# Patient Record
Sex: MALE | Race: BLACK OR AFRICAN AMERICAN | Employment: FULL TIME | URBAN - METROPOLITAN AREA
[De-identification: names, ages, dates, MRNs, and addresses within clinical notes are randomized per-mention and may not be internally consistent; named-entity substitution may affect disease eponyms.]

---

## 2022-04-08 ENCOUNTER — APPOINTMENT (OUTPATIENT)
Dept: NON INVASIVE DIAGNOSTICS | Age: 52
DRG: 639 | End: 2022-04-08
Payer: COMMERCIAL

## 2022-04-08 ENCOUNTER — HOSPITAL ENCOUNTER (INPATIENT)
Age: 52
LOS: 1 days | Discharge: HOME | DRG: 639 | End: 2022-04-09
Attending: STUDENT IN AN ORGANIZED HEALTH CARE EDUCATION/TRAINING PROGRAM | Admitting: INTERNAL MEDICINE
Payer: COMMERCIAL

## 2022-04-08 ENCOUNTER — APPOINTMENT (OUTPATIENT)
Dept: GENERAL RADIOLOGY | Age: 52
DRG: 639 | End: 2022-04-08
Payer: COMMERCIAL

## 2022-04-08 DIAGNOSIS — R55 SYNCOPE AND COLLAPSE: Primary | ICD-10-CM

## 2022-04-08 DIAGNOSIS — R73.9 HYPERGLYCEMIA: ICD-10-CM

## 2022-04-08 DIAGNOSIS — E11.9 TYPE 2 DIABETES MELLITUS WITHOUT COMPLICATION, WITHOUT LONG-TERM CURRENT USE OF INSULIN (HCC): ICD-10-CM

## 2022-04-08 LAB
ABSOLUTE EOS #: 0.1 K/UL (ref 0–0.4)
ABSOLUTE LYMPH #: 1 K/UL (ref 1–4.8)
ABSOLUTE MONO #: 0.4 K/UL (ref 0.1–1.3)
ALBUMIN SERPL-MCNC: 3.7 G/DL (ref 3.5–5.2)
ALP BLD-CCNC: 95 U/L (ref 40–129)
ALT SERPL-CCNC: 23 U/L (ref 5–41)
ANION GAP SERPL CALCULATED.3IONS-SCNC: 9 MMOL/L (ref 9–17)
AST SERPL-CCNC: 20 U/L
BACTERIA: ABNORMAL
BASOPHILS # BLD: 1 % (ref 0–2)
BASOPHILS ABSOLUTE: 0 K/UL (ref 0–0.2)
BETA-HYDROXYBUTYRATE: 0.1 MMOL/L (ref 0.02–0.27)
BILIRUB SERPL-MCNC: 0.36 MG/DL (ref 0.3–1.2)
BILIRUBIN URINE: NEGATIVE
BUN BLDV-MCNC: 16 MG/DL (ref 6–20)
CALCIUM SERPL-MCNC: 9 MG/DL (ref 8.6–10.4)
CARBOXYHEMOGLOBIN: 2.8 % (ref 0–5)
CASTS UA: ABNORMAL /LPF
CHLORIDE BLD-SCNC: 102 MMOL/L (ref 98–107)
CO2: 21 MMOL/L (ref 20–31)
COLOR: YELLOW
CREAT SERPL-MCNC: 0.9 MG/DL (ref 0.7–1.2)
EOSINOPHILS RELATIVE PERCENT: 2 % (ref 0–4)
EPITHELIAL CELLS UA: ABNORMAL /HPF
ESTIMATED AVERAGE GLUCOSE: 249 MG/DL
GFR AFRICAN AMERICAN: >60 ML/MIN
GFR NON-AFRICAN AMERICAN: >60 ML/MIN
GFR SERPL CREATININE-BSD FRML MDRD: ABNORMAL ML/MIN/{1.73_M2}
GLUCOSE BLD-MCNC: 179 MG/DL (ref 75–110)
GLUCOSE BLD-MCNC: 187 MG/DL (ref 75–110)
GLUCOSE BLD-MCNC: 260 MG/DL (ref 75–110)
GLUCOSE BLD-MCNC: 415 MG/DL (ref 70–99)
GLUCOSE URINE: ABNORMAL
HBA1C MFR BLD: 10.3 % (ref 4–6)
HCO3 VENOUS: 22.3 MMOL/L (ref 24–30)
HCT VFR BLD CALC: 48.9 % (ref 41–53)
HEMOGLOBIN: 16.3 G/DL (ref 13.5–17.5)
KETONES, URINE: NEGATIVE
LEUKOCYTE ESTERASE, URINE: NEGATIVE
LV EF: 55 %
LVEF MODALITY: NORMAL
LYMPHOCYTES # BLD: 24 % (ref 24–44)
MCH RBC QN AUTO: 31.3 PG (ref 26–34)
MCHC RBC AUTO-ENTMCNC: 33.3 G/DL (ref 31–37)
MCV RBC AUTO: 94 FL (ref 80–100)
METHEMOGLOBIN: 0.3 % (ref 0–1.9)
MONOCYTES # BLD: 9 % (ref 1–7)
NEGATIVE BASE EXCESS, VEN: 2.5 MMOL/L (ref 0–2)
NITRITE, URINE: NEGATIVE
O2 SAT, VEN: 88.6 % (ref 60–85)
PATIENT TEMP: 37
PCO2, VEN: 36.3 (ref 39–55)
PDW BLD-RTO: 14.3 % (ref 11.5–14.9)
PH UA: 5.5 (ref 5–8)
PH VENOUS: 7.4 (ref 7.32–7.42)
PLATELET # BLD: 204 K/UL (ref 150–450)
PMV BLD AUTO: 9.3 FL (ref 6–12)
PO2, VEN: 57.8 (ref 30–50)
POTASSIUM SERPL-SCNC: 3.8 MMOL/L (ref 3.7–5.3)
PROTEIN UA: NEGATIVE
RBC # BLD: 5.2 M/UL (ref 4.5–5.9)
RBC UA: ABNORMAL /HPF
SEG NEUTROPHILS: 64 % (ref 36–66)
SEGMENTED NEUTROPHILS ABSOLUTE COUNT: 2.6 K/UL (ref 1.3–9.1)
SODIUM BLD-SCNC: 132 MMOL/L (ref 135–144)
SPECIFIC GRAVITY UA: 1.02 (ref 1–1.03)
TOTAL PROTEIN: 6.3 G/DL (ref 6.4–8.3)
TROPONIN, HIGH SENSITIVITY: 10 NG/L (ref 0–22)
TROPONIN, HIGH SENSITIVITY: 9 NG/L (ref 0–22)
TURBIDITY: CLEAR
URINE HGB: NEGATIVE
UROBILINOGEN, URINE: NORMAL
WBC # BLD: 4 K/UL (ref 3.5–11)
WBC UA: ABNORMAL /HPF

## 2022-04-08 PROCEDURE — 97116 GAIT TRAINING THERAPY: CPT

## 2022-04-08 PROCEDURE — 2060000000 HC ICU INTERMEDIATE R&B

## 2022-04-08 PROCEDURE — C8929 TTE W OR WO FOL WCON,DOPPLER: HCPCS

## 2022-04-08 PROCEDURE — 36415 COLL VENOUS BLD VENIPUNCTURE: CPT

## 2022-04-08 PROCEDURE — 2580000003 HC RX 258: Performed by: STUDENT IN AN ORGANIZED HEALTH CARE EDUCATION/TRAINING PROGRAM

## 2022-04-08 PROCEDURE — G0378 HOSPITAL OBSERVATION PER HR: HCPCS

## 2022-04-08 PROCEDURE — 96360 HYDRATION IV INFUSION INIT: CPT

## 2022-04-08 PROCEDURE — 80053 COMPREHEN METABOLIC PANEL: CPT

## 2022-04-08 PROCEDURE — 83036 HEMOGLOBIN GLYCOSYLATED A1C: CPT

## 2022-04-08 PROCEDURE — 82800 BLOOD PH: CPT

## 2022-04-08 PROCEDURE — 6370000000 HC RX 637 (ALT 250 FOR IP)

## 2022-04-08 PROCEDURE — 71045 X-RAY EXAM CHEST 1 VIEW: CPT

## 2022-04-08 PROCEDURE — 82010 KETONE BODYS QUAN: CPT

## 2022-04-08 PROCEDURE — 93005 ELECTROCARDIOGRAM TRACING: CPT | Performed by: STUDENT IN AN ORGANIZED HEALTH CARE EDUCATION/TRAINING PROGRAM

## 2022-04-08 PROCEDURE — 82947 ASSAY GLUCOSE BLOOD QUANT: CPT

## 2022-04-08 PROCEDURE — 84484 ASSAY OF TROPONIN QUANT: CPT

## 2022-04-08 PROCEDURE — 6360000002 HC RX W HCPCS

## 2022-04-08 PROCEDURE — 85025 COMPLETE CBC W/AUTO DIFF WBC: CPT

## 2022-04-08 PROCEDURE — 96372 THER/PROPH/DIAG INJ SC/IM: CPT

## 2022-04-08 PROCEDURE — 81001 URINALYSIS AUTO W/SCOPE: CPT

## 2022-04-08 PROCEDURE — 2580000003 HC RX 258

## 2022-04-08 PROCEDURE — 97161 PT EVAL LOW COMPLEX 20 MIN: CPT

## 2022-04-08 PROCEDURE — 99285 EMERGENCY DEPT VISIT HI MDM: CPT

## 2022-04-08 PROCEDURE — 99223 1ST HOSP IP/OBS HIGH 75: CPT | Performed by: INTERNAL MEDICINE

## 2022-04-08 PROCEDURE — 82805 BLOOD GASES W/O2 SATURATION: CPT

## 2022-04-08 RX ORDER — ACETAMINOPHEN 650 MG/1
650 SUPPOSITORY RECTAL EVERY 6 HOURS PRN
Status: DISCONTINUED | OUTPATIENT
Start: 2022-04-08 | End: 2022-04-09 | Stop reason: HOSPADM

## 2022-04-08 RX ORDER — POLYETHYLENE GLYCOL 3350 17 G/17G
17 POWDER, FOR SOLUTION ORAL DAILY PRN
Status: DISCONTINUED | OUTPATIENT
Start: 2022-04-08 | End: 2022-04-09 | Stop reason: HOSPADM

## 2022-04-08 RX ORDER — SODIUM CHLORIDE 0.9 % (FLUSH) 0.9 %
5-40 SYRINGE (ML) INJECTION EVERY 12 HOURS SCHEDULED
Status: DISCONTINUED | OUTPATIENT
Start: 2022-04-08 | End: 2022-04-09 | Stop reason: HOSPADM

## 2022-04-08 RX ORDER — ONDANSETRON 2 MG/ML
4 INJECTION INTRAMUSCULAR; INTRAVENOUS EVERY 6 HOURS PRN
Status: DISCONTINUED | OUTPATIENT
Start: 2022-04-08 | End: 2022-04-09 | Stop reason: HOSPADM

## 2022-04-08 RX ORDER — SODIUM CHLORIDE 0.9 % (FLUSH) 0.9 %
5-40 SYRINGE (ML) INJECTION PRN
Status: DISCONTINUED | OUTPATIENT
Start: 2022-04-08 | End: 2022-04-09 | Stop reason: HOSPADM

## 2022-04-08 RX ORDER — DEXTROSE MONOHYDRATE 50 MG/ML
100 INJECTION, SOLUTION INTRAVENOUS PRN
Status: DISCONTINUED | OUTPATIENT
Start: 2022-04-08 | End: 2022-04-09 | Stop reason: HOSPADM

## 2022-04-08 RX ORDER — NICOTINE POLACRILEX 4 MG
15 LOZENGE BUCCAL PRN
Status: DISCONTINUED | OUTPATIENT
Start: 2022-04-08 | End: 2022-04-09 | Stop reason: HOSPADM

## 2022-04-08 RX ORDER — INSULIN GLARGINE 100 [IU]/ML
3 INJECTION, SOLUTION SUBCUTANEOUS
Status: DISCONTINUED | OUTPATIENT
Start: 2022-04-08 | End: 2022-04-08

## 2022-04-08 RX ORDER — DEXTROSE MONOHYDRATE 25 G/50ML
12.5 INJECTION, SOLUTION INTRAVENOUS PRN
Status: DISCONTINUED | OUTPATIENT
Start: 2022-04-08 | End: 2022-04-09 | Stop reason: HOSPADM

## 2022-04-08 RX ORDER — POTASSIUM CHLORIDE 7.45 MG/ML
10 INJECTION INTRAVENOUS PRN
Status: DISCONTINUED | OUTPATIENT
Start: 2022-04-08 | End: 2022-04-09 | Stop reason: HOSPADM

## 2022-04-08 RX ORDER — IBUPROFEN 400 MG/1
400 TABLET ORAL EVERY 6 HOURS PRN
Status: ON HOLD | COMMUNITY
End: 2022-04-09 | Stop reason: HOSPADM

## 2022-04-08 RX ORDER — ACETAMINOPHEN 325 MG/1
650 TABLET ORAL EVERY 6 HOURS PRN
Status: DISCONTINUED | OUTPATIENT
Start: 2022-04-08 | End: 2022-04-09 | Stop reason: HOSPADM

## 2022-04-08 RX ORDER — ONDANSETRON 4 MG/1
4 TABLET, ORALLY DISINTEGRATING ORAL EVERY 8 HOURS PRN
Status: DISCONTINUED | OUTPATIENT
Start: 2022-04-08 | End: 2022-04-09 | Stop reason: HOSPADM

## 2022-04-08 RX ORDER — POTASSIUM CHLORIDE 20 MEQ/1
40 TABLET, EXTENDED RELEASE ORAL PRN
Status: DISCONTINUED | OUTPATIENT
Start: 2022-04-08 | End: 2022-04-09 | Stop reason: HOSPADM

## 2022-04-08 RX ORDER — SODIUM CHLORIDE 9 MG/ML
INJECTION, SOLUTION INTRAVENOUS PRN
Status: DISCONTINUED | OUTPATIENT
Start: 2022-04-08 | End: 2022-04-09 | Stop reason: HOSPADM

## 2022-04-08 RX ORDER — 0.9 % SODIUM CHLORIDE 0.9 %
1000 INTRAVENOUS SOLUTION INTRAVENOUS ONCE
Status: COMPLETED | OUTPATIENT
Start: 2022-04-08 | End: 2022-04-08

## 2022-04-08 RX ORDER — SODIUM CHLORIDE 9 MG/ML
INJECTION, SOLUTION INTRAVENOUS CONTINUOUS
Status: DISCONTINUED | OUTPATIENT
Start: 2022-04-08 | End: 2022-04-09 | Stop reason: HOSPADM

## 2022-04-08 RX ORDER — ACETAMINOPHEN 325 MG/1
650 TABLET ORAL EVERY 6 HOURS PRN
Status: ON HOLD | COMMUNITY
End: 2022-04-09 | Stop reason: HOSPADM

## 2022-04-08 RX ADMIN — SODIUM CHLORIDE 1000 ML: 9 INJECTION, SOLUTION INTRAVENOUS at 10:05

## 2022-04-08 RX ADMIN — SODIUM CHLORIDE: 9 INJECTION, SOLUTION INTRAVENOUS at 14:21

## 2022-04-08 RX ADMIN — ENOXAPARIN SODIUM 40 MG: 40 INJECTION SUBCUTANEOUS at 14:45

## 2022-04-08 RX ADMIN — INSULIN LISPRO 1 UNITS: 100 INJECTION, SOLUTION INTRAVENOUS; SUBCUTANEOUS at 20:48

## 2022-04-08 RX ADMIN — INSULIN LISPRO 3 UNITS: 100 INJECTION, SOLUTION INTRAVENOUS; SUBCUTANEOUS at 14:52

## 2022-04-08 RX ADMIN — INSULIN LISPRO 1 UNITS: 100 INJECTION, SOLUTION INTRAVENOUS; SUBCUTANEOUS at 17:41

## 2022-04-08 ASSESSMENT — ENCOUNTER SYMPTOMS
WHEEZING: 0
EYE PAIN: 0
ABDOMINAL DISTENTION: 0
SHORTNESS OF BREATH: 0
SORE THROAT: 0
EYE REDNESS: 0
VOMITING: 0
COUGH: 0
CONSTIPATION: 0
CHEST TIGHTNESS: 0
RHINORRHEA: 0
COLOR CHANGE: 0
FACIAL SWELLING: 0
NAUSEA: 0
DIARRHEA: 0
CHOKING: 0
BACK PAIN: 0
ABDOMINAL PAIN: 0

## 2022-04-08 NOTE — ED PROVIDER NOTES
EMERGENCY DEPARTMENT ENCOUNTER    Pt Name: Sonya Haque  MRN: [id number]  [place] 1970  Date of evaluation: 4/8/22  CHIEF COMPLAINT       Chief Complaint   Patient presents with    Loss of Consciousness     HISTORY OF PRESENT ILLNESS   HPI  48-year-old male previously healthy presents for evaluation after a syncopal episode. In his normal state of health this morning he woke up and felt diaphoretic. Note when he went to get up to go to the bathroom he lost consciousness. Significant other called 911, when EMS got there the patient was reportedly hypotensive. No known seizure-like activity. Patient is now feeling normal.  This has never happened before. Symptoms are moderate and intermittent. Patient is currently denying abdominal pain, chest pain, shortness of breath. REVIEW OF SYSTEMS     Review of Systems   Constitutional: Negative for chills and fatigue. HENT: Negative for facial swelling, nosebleeds, rhinorrhea and sore throat. Eyes: Negative for pain and redness. Respiratory: Negative for cough and shortness of breath. Cardiovascular: Negative for chest pain and leg swelling. Gastrointestinal: Negative for abdominal pain, diarrhea, nausea and vomiting. Genitourinary: Negative for flank pain and hematuria. Musculoskeletal: Negative for arthralgias and back pain. Skin: Negative for color change and rash. Neurological: Positive for syncope. Negative for dizziness, tremors, facial asymmetry, speech difficulty, weakness and numbness. PASTMEDICAL HISTORY   History reviewed. No pertinent past medical history. Past Problem List  Patient Active Problem List   Diagnosis Code    Syncope and collapse R55     SURGICAL HISTORY     History reviewed. No pertinent surgical history.   CURRENT MEDICATIONS       Current Discharge Medication List      CONTINUE these medications which have NOT CHANGED    Details   acetaminophen (TYLENOL) 325 MG tablet Take 650 mg by mouth every 6 hours as needed for Pain      ibuprofen (ADVIL;MOTRIN) 400 MG tablet Take 400 mg by mouth every 6 hours as needed for Pain           ALLERGIES     has No Known Allergies. FAMILY HISTORY     has no family status information on file. SOCIAL HISTORY       Social History     Tobacco Use    Smoking status: Never Smoker    Smokeless tobacco: Never Used   Substance Use Topics    Alcohol use: Not Currently    Drug use: Never     PHYSICAL EXAM     INITIAL VITALS: /82   Pulse 91   Temp 98.7 °F (37.1 °C) (Oral)   Resp 12   Ht 5' 8\" (1.727 m)   Wt 220 lb (99.8 kg)   SpO2 97%   BMI 33.45 kg/m²    Physical Exam  Vitals and nursing note reviewed. Constitutional:       Appearance: Normal appearance. HENT:      Head: Normocephalic and atraumatic. Nose: Nose normal.   Eyes:      Extraocular Movements: Extraocular movements intact. Pupils: Pupils are equal, round, and reactive to light. Cardiovascular:      Rate and Rhythm: Normal rate and regular rhythm. Pulmonary:      Effort: Pulmonary effort is normal. No respiratory distress. Breath sounds: Normal breath sounds. Abdominal:      General: Abdomen is flat. There is no distension. Palpations: Abdomen is soft. There is no mass. Musculoskeletal:         General: No swelling. Normal range of motion. Cervical back: Normal range of motion. No rigidity. Skin:     General: Skin is warm and dry. Neurological:      General: No focal deficit present. Mental Status: He is alert. Mental status is at baseline. Cranial Nerves: No cranial nerve deficit. MEDICAL DECISION MAKIN-year-old male presents for evaluation after a syncopal episode. No known prior history but has not seen a physician in some time. Initial blood sugar was significantly elevated in the 400s no other evidence of DKA. Other blood work unremarkable.   With new onset diabetes, syncope of unclear etiology, abnormal EKG, will admit to the hospital for cardiology evaluation. CRITICAL CARE:       PROCEDURES:    Procedures    DIAGNOSTIC RESULTS   EKG:All EKG's are interpreted by the Emergency Department Physician who either signs or Co-signs this chart in the absence of a cardiologist.    Sinus rhythm rate of 87 normal axis normal intervals Q waves in V1 and V2 no prior to compare    RADIOLOGY:All plain film, CT, MRI, and formal ultrasound images (except ED bedside ultrasound) are read by the radiologist, see reports below, unless otherwisenoted in MDM or here. XR CHEST PORTABLE   Final Result   Negative chest.           LABS: All lab results were reviewed by myself, and all abnormals are listed below. Labs Reviewed   CBC WITH AUTO DIFFERENTIAL - Abnormal; Notable for the following components:       Result Value    Monocytes 9 (*)     All other components within normal limits   COMPREHENSIVE METABOLIC PANEL W/ REFLEX TO MG FOR LOW K - Abnormal; Notable for the following components:    Glucose 415 (*)     Sodium 132 (*)     Total Protein 6.3 (*)     All other components within normal limits   BLOOD GAS, VENOUS - Abnormal; Notable for the following components:    pCO2, Farhat 36.3 (*)     pO2, Farhat 57.8 (*)     HCO3, Venous 22.3 (*)     Negative Base Excess, Farhat 2.5 (*)     O2 Sat, Farhat 88.6 (*)     All other components within normal limits   TROPONIN   TROPONIN   BETA-HYDROXYBUTYRATE   URINALYSIS WITH MICROSCOPIC       EMERGENCY DEPARTMENTCOURSE:         Vitals:    Vitals:    04/08/22 1207 04/08/22 1212 04/08/22 1217 04/08/22 1300   BP:   124/86 122/82   Pulse: 86 81 89 91   Resp: 17 16 19 12   Temp:       TempSrc:       SpO2: 95% 95% 95% 97%   Weight:       Height:           The patient was given the following medications while in the emergency department:  Orders Placed This Encounter   Medications    0.9 % sodium chloride IV bolus 1,000 mL     CONSULTS:  IP CONSULT TO INTERNAL MEDICINE    FINAL IMPRESSION      1. Syncope and collapse    2.  Hyperglycemia DISPOSITION/PLAN   DISPOSITION Admitted 04/08/2022 11:32:28 AM      PATIENT REFERRED TO:  No follow-up provider specified. DISCHARGE MEDICATIONS:  Current Discharge Medication List        The care is provided during an unprecedented national emergency due to the novel coronavirus, COVID 19.   MD Elizabeth Cohen MD  04/08/22 6917

## 2022-04-08 NOTE — LETTER
[address] [address]  Phone: [phone]    Jalil Ramos MD        April 9, 2022     Patient: Belgica Sandoval   YOB: 1970   Date of Visit: 4/8/2022       To Whom It May Concern: It is my medical opinion that Modesto Gonzalez may return to work on 4/11/2022 without any work restrictions. If you have any questions or concerns, please don't hesitate to call.     Sincerely,        Jalil Ramos MD

## 2022-04-08 NOTE — ED NOTES
Report called to [facility]. Patient is ready to go, in no distress, personal  Belongings to go up with patient, IV line patent, dressing clean dry and intact.       Marchelle Aschoff, RN  04/08/22 4136

## 2022-04-08 NOTE — PROGRESS NOTES
Physical Therapy    Facility/Department: [facility] AND [facility] PROGRESSIVE CARE  Initial Assessment    NAME: Treva Fox  : 1970  MRN: [id number]    Date of Service: 2022    Discharge Recommendations:    No further therapy required at discharge. Assessment   Assessment: Pt demonstates safe fucntional mobility as well as going up and down 6\" step upsx 10 reps, no need for skilled pt at this time. Decision Making: Low Complexity  No Skilled PT: Safe to return home  REQUIRES PT FOLLOW UP: No       Patient Diagnosis(es): The primary encounter diagnosis was Syncope and collapse. A diagnosis of Hyperglycemia was also pertinent to this visit. has no past medical history on file. has no past surgical history on file. Restrictions  Restrictions/Precautions  Restrictions/Precautions: Fall Risk  Vision/Hearing  Vision: Within Functional Limits  Hearing: Within functional limits     Subjective  General  Patient assessed for rehabilitation services?: Yes  Additional Pertinent Hx: 69-year-old male previously healthy presents for evaluation after a syncopal episode. In his normal state of health this morning he woke up and felt diaphoretic. Note when he went to get up to go to the bathroom he lost consciousness. Significant other called 911, when EMS got there the patient was reportedly hypotensive. No known seizure-like activity. Patient is now feeling normal.  This has never happened before. Symptoms are moderate and intermittent. Patient is currently denying abdominal pain, chest pain, shortness of breath. In the ED his blood glucose was found to be in the 400s. Patient is complaining of some recent polyuria and polydipsia. Gracie Lovell Referral Date : 22  Diagnosis: Syncope and collapse  Follows Commands: Within Functional Limits  Subjective  Subjective: Pt sound asleep. Mother and Aunt present, reports pt works late night shift, came home 3 int eh morning, and was in ER all morning, has not slept.  Pt arousabel and agreeable to participate in therapy assessment. Pain Screening  Patient Currently in Pain: No  Vital Signs  Blood Pressure Lyin/88  Pulse Lyin PER MINUTE  Blood Pressure Sittin/85  Pulse Sittin PER MINUTE  Blood Pressure Standin/86  Pulse Standin PER MINUTE  Patient Currently in Pain: No       Orientation  Orientation  Overall Orientation Status: Within Normal Limits  Social/Functional History  Social/Functional History  Lives With:  (Girlfriend and his son)  Type of Home: House  Home Layout: Two level,Bed/Bath upstairs (1/2 bath in basement)  Home Access: Stairs to enter with rails,Stairs to enter without rails  Entrance Stairs - Number of Steps: 2 step on the side walk with no rail, 4 steps at entrance with 2 rails (too wide),flight to [facility] floo and to basement has 1 rail  Bathroom Shower/Tub: Tub/Shower unit  Bathroom Toilet: Standard  Bathroom Accessibility: Accessible  Home Equipment:  (No DME)  ADL Assistance: Independent  Ambulation Assistance: Independent  Transfer Assistance: Independent  Active : Yes  Mode of Transportation: Truck  Occupation: Full time employment  Type of occupation:   Additional Comments: Girl friend does all the home making chores, she is home  and can assist as needed.   Cognition        Objective          AROM RLE (degrees)  RLE AROM: WFL  AROM LLE (degrees)  LLE AROM : WFL  AROM RUE (degrees)  RUE AROM : WFL  AROM LUE (degrees)  LUE AROM : WFL  Strength RLE  Strength RLE: WFL  Strength RUE  Strength RUE: WFL     Sensation  Overall Sensation Status: WFL  Bed mobility  Rolling to Left: Independent  Rolling to Right: Independent  Supine to Sit: Independent  Sit to Supine: Independent  Scooting: Independent  Transfers  Sit to Stand: Supervision  Stand to sit: Supervision  Bed to Chair: Supervision  Ambulation  Ambulation?: Yes  Ambulation 1  Surface: level tile  Device: No Device  Assistance: Supervision  Quality of Gait: No gait deviation, no dizzyness/lightheadedness. Distance: 150 ft  Comments: pt go up independnetly and went to the bathroon on his own after the assessment.   Stairs/Curb  Stairs?: Yes  Stairs  # Steps : 10 (step ups)  Stairs Height: 6\"  Rails: None  Assistance: Supervision     Balance  Posture: Good  Sitting - Static: Good  Sitting - Dynamic: Good  Standing - Static: Good  Standing - Dynamic: Good        Plan   Plan  Times per week: NA    G-Code       OutComes Score                                                  AM-PAC Score  AM-PAC Inpatient Mobility Raw Score : 24 (04/08/22 1626)  AM-PAC Inpatient T-Scale Score : 61.14 (04/08/22 1626)  Mobility Inpatient CMS 0-100% Score: 0 (04/08/22 1626)  Mobility Inpatient CMS G-Code Modifier : CH (04/08/22 1626)          Goals  Short term goals  Time Frame for Short term goals: NA       Therapy Time   Individual Concurrent Group Co-treatment   Time In 1535         Time Out 1600         Minutes 25                 Mc Tadeo, PT

## 2022-04-08 NOTE — LETTER
[address] [address]  Phone: [phone]             April 9, 2022    Patient: Isa Oneill   YOB: 1970   Date of Visit: 4/8/2022       To Whom It May Concern:    Lora Burk was seen and treated in our facility  beginning 4/8/2022 until 4/09/2022.     Sincerely,       Ted Gandara RN         Signature:__________________________________

## 2022-04-08 NOTE — PROGRESS NOTES
Medication History completed:    New medications: tylenol and ibuprofen    Medications discontinued: none    Changes to dosing: none    Stated allergies: NKDA    Other pertinent information: Medications confirmed with patient. Reports only utilizing OTC tylenol and ibuprofen.      Thank you,   Hodan Chambers  PharmD Candidate 2022

## 2022-04-08 NOTE — ED NOTES
Mode of arrival (squad #, walk in, police, etc) : EMS        Chief complaint(s): Syncopal episode         Arrival Note (brief scenario, treatment PTA, etc). : Patient arrived to ED from home via EMS following a syncopal episode. Patient states this morning he woke up and felt very warm, so he went to the bathroom and when he went to stand up and leave the bathroom when his fiance said he \"passed out\". Patient sates he doesn't remember \"passing out\", denies hitting his head, denies any neck pain, SOB, CP, dizziness, HA, visual disturbances, Abd pain, N/V/D, fevers, or chills. Patient is alert and oriented, has no complaints or pain anywhere. Patient denies any PMHX and denies taking any medications on a daily basis. Neuro Assessment WNL, VS WNL, patient in no distress, EKG  Completed upon arrival, blood work obtained and sent to lab. ED Provider at bedside         [facility] you ever felt that you should Cut down on your drinking? \"  No  A= \"Have people Annoyed you by criticizing your drinking? \"  No  G= \"Have you ever felt bad or Guilty about your drinking? \"  No  E= \"Have you ever had a drink as an Eye-opener first thing in the morning to steady your nerves or to help a hangover? \"  No      Deferred []      Reason for deferring: N/A    *If yes to two or more: probable alcohol abuse. Debbie Kinney RN  04/08/22 1274

## 2022-04-08 NOTE — H&P
[address]      [address] [facility]     HISTORY AND PHYSICAL EXAMINATION            Date:   4/8/2022  Patient name:  Johnie Martinez  Date of admission:  4/8/2022  9:28 AM  MRN:   [id number]  Account:  [de-identified]  YOB: 1970  PCP:    No primary care provider on file. Room:   [address] [place] [address]  Code Status:    Full Code    Chief Complaint:     Chief Complaint   Patient presents with    Loss of Consciousness       History Obtained From:     patient, mother, family member - Maternal Aunt    History of Present Illness: The patient is a 47 y.o. Non- / non  male who presents withLoss of Consciousness   and he is admitted to the hospital for the management of syncope and hypoglycemia. This morning while patient was lying in bed he began to feel warm, diaphoretic and have abdominal discomfort. He denied any chest pain, shortness of breath, palpitations at this time. He got out of bed to use the bathroom as he thought that would alleviate his discomfort. He had a bowel movement and when he stood up from a bowel movement she lost consciousness. At the time his girlfriend heard him fall and came to check on him. She did not notice any shaking movement, tongue biting, loss of urine or bowel. He was shifting between lucid state which she can answer questions appropriate to loss of consciousness. This lasted less than 2 minutes. After he was asymptomatic, did not have any confusion. Patient had one episode similar to this about 10 years ago but did not see a physician over to the hospital.  Otherwise patient does not have any significant past medical history. His blood glucose was checked by EMS was found to be in the 300s. Patient was not diagnosed with diabetes in the past but is a strong family history.   In the ED his blood glucose was found to be in the 400s. Patient is complaining of some recent polyuria and polydipsia. Per mother and maternal aunt, patient has a family history of heart disease in his grandfather and maternal uncle. One of his maternal aunts and his grandfather also suffered from vasovagal syncope. Currently on no medications at home. Past Medical History:     History reviewed. No pertinent past medical history. Past SurgicalHistory:     History reviewed. No pertinent surgical history. Medications Prior to Admission:        Prior to Admission medications    Medication Sig Start Date End Date Taking? Authorizing Provider   acetaminophen (TYLENOL) 325 MG tablet Take 650 mg by mouth every 6 hours as needed for Pain   Yes Historical Provider, MD   ibuprofen (ADVIL;MOTRIN) 400 MG tablet Take 400 mg by mouth every 6 hours as needed for Pain   Yes Historical Provider, MD        Allergies:     Patient has no known allergies. Social History:     Tobacco:    reports that he has never smoked. He has never used smokeless tobacco.  Alcohol:      reports previous alcohol use. Drug Use:  reports no history of drug use. Family History:     History reviewed. No pertinent family history. Review of Systems:     Positive and Negative as described in HPI. Review of Systems   Constitutional: Negative for activity change, appetite change, chills, fatigue and fever. Respiratory: Negative for cough, choking, chest tightness, shortness of breath and wheezing. Cardiovascular: Negative for chest pain and palpitations. Gastrointestinal: Negative for abdominal distention, abdominal pain, constipation, diarrhea, nausea and vomiting. Endocrine: Positive for polydipsia and polyuria. Negative for polyphagia. Genitourinary: Negative for dysuria, flank pain, frequency and urgency. Musculoskeletal: Negative for back pain, gait problem, myalgias, neck pain and neck stiffness.    Neurological: Negative for dizziness, tremors, seizures, syncope, facial asymmetry, speech difficulty, weakness, light-headedness, numbness and headaches. Psychiatric/Behavioral: Negative for dysphoric mood and sleep disturbance. The patient is not nervous/anxious. Physical Exam:   /85   Pulse 86   Temp 97.7 °F (36.5 °C) (Oral)   Resp 16   Ht 5' 8\" (1.727 m)   Wt 220 lb (99.8 kg)   SpO2 97%   BMI 33.45 kg/m²   Temp (24hrs), Av.2 °F (36.8 °C), Min:97.7 °F (36.5 °C), Max:98.7 °F (37.1 °C)    No results for input(s): POCGLU in the last 72 hours. No intake or output data in the 24 hours ending 22 1503    Physical Exam  Constitutional:       General: He is not in acute distress. Appearance: Normal appearance. He is obese. HENT:      Head: Normocephalic and atraumatic. Comments: Cut over the bridge of the nose     Mouth/Throat:      Mouth: Mucous membranes are dry. Pharynx: Oropharynx is clear. Eyes:      Extraocular Movements: Extraocular movements intact. Conjunctiva/sclera: Conjunctivae normal.      Pupils: Pupils are equal, round, and reactive to light. Cardiovascular:      Rate and Rhythm: Normal rate and regular rhythm. Pulses: Normal pulses. Heart sounds: Normal heart sounds. Pulmonary:      Effort: Pulmonary effort is normal. No respiratory distress. Breath sounds: Normal breath sounds. No wheezing or rales. Abdominal:      General: Abdomen is flat. There is no distension. Palpations: Abdomen is soft. Tenderness: There is no abdominal tenderness. There is no guarding. Musculoskeletal:         General: Normal range of motion. Cervical back: Normal range of motion and neck supple. Right lower leg: No edema. Left lower leg: No edema. Skin:     General: Skin is warm and dry. Capillary Refill: Capillary refill takes less than 2 seconds. Findings: No bruising. Neurological:      General: No focal deficit present.       Mental Status: He is alert and oriented to person, place, and time.    Psychiatric:         Mood and Affect: Mood normal.         Behavior: Behavior normal.         Investigations:     Laboratory Testing:  Recent Results (from the past 24 hour(s))   CBC with Auto Differential    Collection Time: 04/08/22  9:58 AM   Result Value Ref Range    WBC 4.0 3.5 - 11.0 k/uL    RBC 5.20 4.5 - 5.9 m/uL    Hemoglobin 16.3 13.5 - 17.5 g/dL    Hematocrit 48.9 41 - 53 %    MCV 94.0 80 - 100 fL    MCH 31.3 26 - 34 pg    MCHC 33.3 31 - 37 g/dL    RDW 14.3 11.5 - 14.9 %    Platelets 459 923 - 698 k/uL    MPV 9.3 6.0 - 12.0 fL    Seg Neutrophils 64 36 - 66 %    Lymphocytes 24 24 - 44 %    Monocytes 9 (H) 1 - 7 %    Eosinophils % 2 0 - 4 %    Basophils 1 0 - 2 %    Segs Absolute 2.60 1.3 - 9.1 k/uL    Absolute Lymph # 1.00 1.0 - 4.8 k/uL    Absolute Mono # 0.40 0.1 - 1.3 k/uL    Absolute Eos # 0.10 0.0 - 0.4 k/uL    Basophils Absolute 0.00 0.0 - 0.2 k/uL   Comprehensive Metabolic Panel w/ Reflex to MG    Collection Time: 04/08/22  9:58 AM   Result Value Ref Range    Glucose 415 (HH) 70 - 99 mg/dL    BUN 16 6 - 20 mg/dL    CREATININE 0.90 0.70 - 1.20 mg/dL    Calcium 9.0 8.6 - 10.4 mg/dL    Sodium 132 (L) 135 - 144 mmol/L    Potassium 3.8 3.7 - 5.3 mmol/L    Chloride 102 98 - 107 mmol/L    CO2 21 20 - 31 mmol/L    Anion Gap 9 9 - 17 mmol/L    Alkaline Phosphatase 95 40 - 129 U/L    ALT 23 5 - 41 U/L    AST 20 <40 U/L    Total Bilirubin 0.36 0.3 - 1.2 mg/dL    Total Protein 6.3 (L) 6.4 - 8.3 g/dL    Albumin 3.7 3.5 - 5.2 g/dL    GFR Non-African American >60 >60 mL/min    GFR African American >60 >60 mL/min    GFR Comment         Troponin    Collection Time: 04/08/22  9:58 AM   Result Value Ref Range    Troponin, High Sensitivity 9 0 - 22 ng/L   EKG 12 Lead    Collection Time: 04/08/22 10:02 AM   Result Value Ref Range    Ventricular Rate 86 BPM    Atrial Rate 86 BPM    P-R Interval 134 ms    QRS Duration 76 ms    Q-T Interval 364 ms    QTc Calculation (Bazett) 435 ms P Axis 62 degrees    R Axis 28 degrees    T Axis 46 degrees   EKG 12 Lead    Collection Time: 04/08/22 11:20 AM   Result Value Ref Range    Ventricular Rate 87 BPM    Atrial Rate 87 BPM    P-R Interval 136 ms    QRS Duration 78 ms    Q-T Interval 354 ms    QTc Calculation (Bazett) 425 ms    P Axis 62 degrees    R Axis 28 degrees    T Axis 41 degrees   Troponin    Collection Time: 04/08/22 11:22 AM   Result Value Ref Range    Troponin, High Sensitivity 10 0 - 22 ng/L   Beta-Hydroxybutyrate    Collection Time: 04/08/22 11:22 AM   Result Value Ref Range    Beta-Hydroxybutyrate 0.10 0.02 - 0.27 mmol/L   BLOOD GAS, VENOUS    Collection Time: 04/08/22 12:50 PM   Result Value Ref Range    pH, Farhat 7.398 7.320 - 7.420    pCO2, Farhat 36.3 (L) 39.0 - 55.0    pO2, Farhat 57.8 (H) 30.0 - 50.0    HCO3, Venous 22.3 (L) 24.0 - 30.0 mmol/L    Negative Base Excess, Farhat 2.5 (H) 0.0 - 2.0 mmol/L    O2 Sat, Farhat 88.6 (H) 60.0 - 85.0 %    Carboxyhemoglobin 2.8 0 - 5 %    Methemoglobin 0.3 0.0 - 1.9 %    Pt Temp 37        Imaging/Diagnostics:  XR CHEST PORTABLE    Result Date: 4/8/2022  EXAMINATION: ONE XRAY VIEW OF THE CHEST 4/8/2022 9:43 am COMPARISON: None. HISTORY: Reason for Exam: Syncope FINDINGS: The lungs are without acute focal process. No effusion or pneumothorax. The cardiomediastinal silhouette is normal.  The osseous structures are intact without acute process.      Negative chest.       Assessment :      Primary Problem  Syncope and collapse    Active Hospital Problems    Diagnosis Date Noted    Syncope and collapse [R55] 04/08/2022    Type 2 diabetes mellitus without complication, without long-term current use of insulin ([facility] [E11.9] 04/08/2022       Plan:     Patient status Admit as inpatient in the  Progressive Unit/Step down    Syncope  Laying in bed, preceded by feeling of warmth, diaphoresis, abdominal discomfort  Lost consciousness after using the bathroom  Family history of vasovagal syncope  Troponins negative  EKG

## 2022-04-09 VITALS
BODY MASS INDEX: 34.98 KG/M2 | HEART RATE: 91 BPM | WEIGHT: 230.82 LBS | OXYGEN SATURATION: 96 % | SYSTOLIC BLOOD PRESSURE: 142 MMHG | TEMPERATURE: 97.4 F | HEIGHT: 68 IN | RESPIRATION RATE: 18 BRPM | DIASTOLIC BLOOD PRESSURE: 87 MMHG

## 2022-04-09 LAB
ANION GAP SERPL CALCULATED.3IONS-SCNC: 9 MMOL/L (ref 9–17)
BUN BLDV-MCNC: 11 MG/DL (ref 6–20)
CALCIUM SERPL-MCNC: 8.5 MG/DL (ref 8.6–10.4)
CHLORIDE BLD-SCNC: 104 MMOL/L (ref 98–107)
CO2: 23 MMOL/L (ref 20–31)
CREAT SERPL-MCNC: 0.76 MG/DL (ref 0.7–1.2)
GFR AFRICAN AMERICAN: >60 ML/MIN
GFR NON-AFRICAN AMERICAN: >60 ML/MIN
GFR SERPL CREATININE-BSD FRML MDRD: ABNORMAL ML/MIN/{1.73_M2}
GLUCOSE BLD-MCNC: 157 MG/DL (ref 75–110)
GLUCOSE BLD-MCNC: 161 MG/DL (ref 75–110)
GLUCOSE BLD-MCNC: 176 MG/DL (ref 70–99)
GLUCOSE BLD-MCNC: 185 MG/DL (ref 75–110)
POTASSIUM SERPL-SCNC: 3.6 MMOL/L (ref 3.7–5.3)
SODIUM BLD-SCNC: 136 MMOL/L (ref 135–144)

## 2022-04-09 PROCEDURE — 6360000002 HC RX W HCPCS

## 2022-04-09 PROCEDURE — 99239 HOSP IP/OBS DSCHRG MGMT >30: CPT | Performed by: INTERNAL MEDICINE

## 2022-04-09 PROCEDURE — G0378 HOSPITAL OBSERVATION PER HR: HCPCS

## 2022-04-09 PROCEDURE — 80048 BASIC METABOLIC PNL TOTAL CA: CPT

## 2022-04-09 PROCEDURE — 96372 THER/PROPH/DIAG INJ SC/IM: CPT

## 2022-04-09 PROCEDURE — 2580000003 HC RX 258

## 2022-04-09 PROCEDURE — 36415 COLL VENOUS BLD VENIPUNCTURE: CPT

## 2022-04-09 PROCEDURE — 82947 ASSAY GLUCOSE BLOOD QUANT: CPT

## 2022-04-09 PROCEDURE — 6370000000 HC RX 637 (ALT 250 FOR IP)

## 2022-04-09 RX ORDER — BLOOD-GLUCOSE METER
1 KIT MISCELLANEOUS DAILY
Qty: 1 KIT | Refills: 0 | Status: SHIPPED | OUTPATIENT
Start: 2022-04-09

## 2022-04-09 RX ORDER — GLIMEPIRIDE 2 MG/1
2 TABLET ORAL
Status: CANCELLED | OUTPATIENT
Start: 2022-04-10

## 2022-04-09 RX ORDER — PEN NEEDLE, DIABETIC 31 G X1/4"
1 NEEDLE, DISPOSABLE MISCELLANEOUS DAILY
Qty: 100 EACH | Refills: 3 | Status: SHIPPED | OUTPATIENT
Start: 2022-04-09

## 2022-04-09 RX ORDER — INSULIN GLARGINE 100 [IU]/ML
10 INJECTION, SOLUTION SUBCUTANEOUS NIGHTLY
Status: DISCONTINUED | OUTPATIENT
Start: 2022-04-09 | End: 2022-04-09 | Stop reason: HOSPADM

## 2022-04-09 RX ORDER — GLIMEPIRIDE 2 MG/1
2 TABLET ORAL
Qty: 60 TABLET | Refills: 3 | Status: SHIPPED | OUTPATIENT
Start: 2022-04-09 | End: 2022-04-15 | Stop reason: SDUPTHER

## 2022-04-09 RX ORDER — GLUCOSAMINE HCL/CHONDROITIN SU 500-400 MG
CAPSULE ORAL
Qty: 300 STRIP | Refills: 1 | Status: SHIPPED | OUTPATIENT
Start: 2022-04-09 | End: 2022-08-25 | Stop reason: SDUPTHER

## 2022-04-09 RX ADMIN — ENOXAPARIN SODIUM 40 MG: 40 INJECTION SUBCUTANEOUS at 08:05

## 2022-04-09 RX ADMIN — INSULIN LISPRO 1 UNITS: 100 INJECTION, SOLUTION INTRAVENOUS; SUBCUTANEOUS at 08:06

## 2022-04-09 RX ADMIN — SODIUM CHLORIDE, PRESERVATIVE FREE 10 ML: 5 INJECTION INTRAVENOUS at 08:06

## 2022-04-09 RX ADMIN — SODIUM CHLORIDE: 9 INJECTION, SOLUTION INTRAVENOUS at 05:19

## 2022-04-09 RX ADMIN — INSULIN LISPRO 2 UNITS: 100 INJECTION, SOLUTION INTRAVENOUS; SUBCUTANEOUS at 11:54

## 2022-04-09 ASSESSMENT — ENCOUNTER SYMPTOMS
WHEEZING: 0
COUGH: 0
DIARRHEA: 0
SHORTNESS OF BREATH: 0
VOMITING: 0
NAUSEA: 0
BACK PAIN: 0
CHEST TIGHTNESS: 0
ABDOMINAL DISTENTION: 0
ABDOMINAL PAIN: 0
CHOKING: 0
CONSTIPATION: 0

## 2022-04-09 ASSESSMENT — PAIN SCALES - GENERAL: PAINLEVEL_OUTOF10: 0

## 2022-04-09 NOTE — DISCHARGE SUMMARY
[address]    Discharge Summary     Patient ID: Orville Seip  :  1970   MRN: [id number]     ACCOUNT:  [de-identified]   Patient's PCP: No primary care provider on file. Admit Date: 2022   Discharge Date: 2022  Length of Stay: 1  Code Status:  Full Code  Admitting Physician: Joycelyn Pierce MD  Discharge Physician: Jesus Ceballos MD     Active Discharge Diagnoses:       Primary Problem  Syncope and collapse      [place] Problems    Diagnosis Date Noted    Syncope and collapse [R55] 2022    Type 2 diabetes mellitus without complication, without long-term current use of insulin ([facility] [address] [E11.9] 2022       Admission Condition:  good     Discharged Condition: good    Hospital Stay:       Hospital Course:  Orville Seip is a 47 y.o. male who was admitted for the management of   Syncope and collapse , presented to ER with Loss of Consciousness    The morning of presentation, while patient was lying in bed he began to feel warm, diaphoretic and have abdominal discomfort. He denied any chest pain, shortness of breath, palpitations at this time. He got out of bed to use the bathroom as he thought that would alleviate his discomfort. He had a bowel movement and when he stood up from a bowel movement she lost consciousness. At the time his girlfriend heard him fall and came to check on him. She did not notice any shaking movement, tongue biting, loss of urine or bowel. He was shifting between lucid state which she can answer questions appropriate to loss of consciousness. This lasted less than 2 minutes. After he was asymptomatic, did not have any confusion. Patient had one episode similar to this about 10 years ago but did not see a physician over to the hospital.  Otherwise patient does not have any significant past medical history.   His blood glucose was checked by EMS was found to be in the 300s.  Patient was not diagnosed with diabetes in the past but is a strong family history. In the ED his blood glucose was found to be in the 400s. Patient is complaining of some recent polyuria and polydipsia. Patient's HbA1c was 10.3. Easily controlled with minimal regular insulin. Cardiac echo grossly unremarkable. Patient will be prescribed Amaryl with close follow-up with PCP. Discussed with patient appropriate diet and exercise plan to control blood sugar. Given that patient had an isolated event of syncope, likely precipitated by hyperglycemia, will hold off on Holter monitor unless this recurs.       Significant therapeutic interventions: Diagnosed patient with type 2 diabetes mellitus, started patient on Amaryl    Significant Diagnostic Studies:   Labs / Micro:  CBC:   Lab Results   Component Value Date    WBC 4.0 04/08/2022    RBC 5.20 04/08/2022    HGB 16.3 04/08/2022    HCT 48.9 04/08/2022    MCV 94.0 04/08/2022    MCH 31.3 04/08/2022    MCHC 33.3 04/08/2022    RDW 14.3 04/08/2022     04/08/2022     CMP:    Lab Results   Component Value Date    GLUCOSE 176 04/09/2022     04/09/2022    K 3.6 04/09/2022     04/09/2022    CO2 23 04/09/2022    BUN 11 04/09/2022    CREATININE 0.76 04/09/2022    ANIONGAP 9 04/09/2022    ALKPHOS 95 04/08/2022    ALT 23 04/08/2022    AST 20 04/08/2022    BILITOT 0.36 04/08/2022    LABALBU 3.7 04/08/2022    LABGLOM >60 04/09/2022    GFRAA >60 04/09/2022    GFR      04/09/2022    PROT 6.3 04/08/2022    CALCIUM 8.5 04/09/2022       Radiology:    Echo Complete    Result Date: 4/9/2022  [facility] Transthoracic Echocardiography Report (TTE)  Patient Name Narciso Meckel    Date of Study               04/08/2022               Jenny Curiel   Date of      1970  Gender                      Male  Birth   Age          47 year(s)  Race                        Black   Room Number  6937        Height:                     68 inch, 172.72 cm   Corporate ID [id number]    Weight:                     220 pounds, 99.8 kg  #   Patient Acct [de-identified]   BSA:          2.13 m^2      BMI:      33.45  #                                                              kg/m^2   MR #         [id number]      Sonographer                 Paula Haynes   Accession #  [id number]  Interpreting Physician      [address]   Fellow                   Referring Nurse                           Practitioner   Interpreting             Referring Physician         Valerie Dumont  Type of Study   TTE procedure:2D Echocardiogram, M-Mode, Doppler, Color Doppler. Procedure Date Date: 04/08/2022 Start: 03:13 PM Study Location: [address] [place] [address] Technical Quality: Fair visualization Indications:Syncope. Patient Status: Inpatient Height: 68 inches Weight: 220 pounds BSA: 2.13 m^2 BMI: 33.45 kg/m^2 Rhythm: Within normal limits HR: 82 bpm BP: 118/85 mmHg CONCLUSIONS Summary Left ventricle is normal in size. Mild left ventricular hypertrophy. Global left ventricular systolic function is normal. Estimated LV EF >55 %. No obvious wall motion abnormality seen. Left atrium is normal in size. No significant valvular regurgitation or stenosis seen. Signature ----------------------------------------------------------------------------  Electronically signed by Paula Haynes(Sonographer) on 04/08/2022 04:04  PM ---------------------------------------------------------------------------- ----------------------------------------------------------------------------  Electronically signed by Avtar Haile(Interpreting physician) on 04/09/2022  01:53 PM ---------------------------------------------------------------------------- FINDINGS Left Atrium Left atrium is normal in size. Left Ventricle Left ventricle is normal in size. Mild left ventricular hypertrophy. Global left ventricular systolic function is normal. Estimated LV EF >55 %. No obvious wall motion abnormality seen.  Right Atrium Right atrium was not well visualized. Right Ventricle Right ventricle was not well visualized. Mitral Valve No obvious valvular abnormality seen. Trivial mitral regurgitation. Aortic Valve No obvious valvular abnormality seen. No evidence of aortic insufficiency or stenosis. Tricuspid Valve Tricuspid valve was not well visualized. Insignificant tricuspid regurgitation, unable to estimate RVSP. Pulmonic Valve Pulmonic valve was not well visualized. No evidence of pulmonic insufficiency or stenosis. Pericardial Effusion No significant pericardial effusion is seen. Pleural Effusion No pleural effusion seen. Miscellaneous E/E' average = 7.7. M-mode / 2D Measurements & Calculations:   LVIDd:4.29 cm(3.7 - 5.6 cm)  LVIDs:3.06 cm(2.2 - 4.0 cm)      LA Dimension: 2.8 cm(1.9 - 4.0 cm)  IVSd:1.15 cm(0.6 - 1.1 cm)  LVPWd:1.17 cm(0.6 - 1.1 cm)  Fractional Shortenin.67 %      XR CHEST PORTABLE    Result Date: 2022  EXAMINATION: ONE XRAY VIEW OF THE CHEST 2022 9:43 am COMPARISON: None. HISTORY: Reason for Exam: Syncope FINDINGS: The lungs are without acute focal process. No effusion or pneumothorax. The cardiomediastinal silhouette is normal.  The osseous structures are intact without acute process. Negative chest.         Consultations:    Consults:     Final Specialist Recommendations/Findings:   IP CONSULT TO INTERNAL MEDICINE  IP CONSULT TO SOCIAL WORK      The patient was seen and examined on day of discharge and this discharge summary is in conjunction with any daily progress note from day of discharge. Discharge plan:       Disposition: Home    Physician Follow Up:     No follow-up provider specified. Requiring Further Evaluation/Follow Up POST HOSPITALIZATION/Incidental Findings: Follow-up with PCP to establish care and follow-up on blood sugars and medication dosing    Diet: diabetic diet    Activity: As tolerated     Instructions to Patient: Avoid sugary foods and sodas. Try to avoid any breads or raw carbs.   Attempt to exercise approximately 90 minutes/week of moderate intensity exercise. Discharge Medications:      Medication List        START taking these medications      glimepiride 2 MG tablet  Commonly known as: Amaryl  Take 1 tablet by mouth 2 times daily (before meals)            STOP taking these medications      ibuprofen 400 MG tablet  Commonly known as: ADVIL;MOTRIN            ASK your doctor about these medications      acetaminophen 325 MG tablet  Commonly known as: TYLENOL               Where to Get Your Medications        These medications were sent to [facility], [address] [facility]  [address] [address]      Phone: [phone]   glimepiride 2 MG tablet           Electronically signed by   Jalil Ramos MD  4/9/2022  2:46 PM      Thank you Dr. Dimple Rivera primary care provider on file. for the opportunity to be involved in this patient's care. Attending Physician Statement  I have discussed the care of Belgica Sandoval and I have examined the patient myselft and taken ros and hpi , including pertinent history and exam findings,  with the resident. I have reviewed the key elements of all parts of the encounter with the resident. I agree with the assessment, plan and orders as documented by the resident. I spent over 35 mins in direct patient care as above and reviewing medications and counseling for discharge .         Electronically signed by Aaron Gaona MD

## 2022-04-09 NOTE — PROGRESS NOTES
Discharge instructions reviewed with and given to pt. Time allotted for questions. Pt discharge home per personal vehicle with belongings. No acute distress noted.

## 2022-04-09 NOTE — PROGRESS NOTES
[address]    PROGRESS NOTE             4/9/2022    8:01 AM    Name:   Mike Mccoy  MRN:     [id number]     Acct:      [de-identified]   Room:   2117/2117-01   Day:  1  Admit Date:  4/8/2022  9:28 AM    PCP:  No primary care provider on file. Code Status:  Full Code    Subjective:     C/C:   Chief Complaint   Patient presents with    Loss of Consciousness     Interval History Status: improved. Patient seen and examined at bedside. No acute events overnight. Echo completed yesterday, pending results. Orthostatic blood pressures within normal limits. Blood glucose this morning 171 on sliding scale. HbA1c returned at 10.3. Started on 10 units Lantus nightly and sliding scale increased to medium dose. Brief History:     Briefly this is a 77-year-old patient who was admitted to the hospital for management of syncope and hyperglycemia. The morning of presentation, while patient was lying in bed he began to feel warm, diaphoretic and have abdominal discomfort. He denied any chest pain, shortness of breath, palpitations at this time. He got out of bed to use the bathroom as he thought that would alleviate his discomfort. He had a bowel movement and when he stood up from a bowel movement she lost consciousness. At the time his girlfriend heard him fall and came to check on him. She did not notice any shaking movement, tongue biting, loss of urine or bowel. He was shifting between lucid state which she can answer questions appropriate to loss of consciousness. This lasted less than 2 minutes. After he was asymptomatic, did not have any confusion. Patient had one episode similar to this about 10 years ago but did not see a physician over to the hospital.  Otherwise patient does not have any significant past medical history. His blood glucose was checked by EMS was found to be in the 300s.   Patient was not diagnosed with diabetes in the past but is a strong family history. In the ED his blood glucose was found to be in the 400s. Patient is complaining of some recent polyuria and polydipsia.     Per mother and maternal aunt, patient has a family history of heart disease in his grandfather and maternal uncle. One of his maternal aunts and his grandfather also suffered from vasovagal syncope. Currently on no medications at home    Review of Systems:     Review of Systems   Constitutional: Negative for activity change, appetite change, chills, fatigue and fever. Respiratory: Negative for cough, choking, chest tightness, shortness of breath and wheezing. Cardiovascular: Negative for chest pain and palpitations. Gastrointestinal: Negative for abdominal distention, abdominal pain, constipation, diarrhea, nausea and vomiting. Endocrine: Positive for polydipsia and polyuria. Negative for polyphagia. Genitourinary: Negative for dysuria, flank pain, frequency and urgency. Musculoskeletal: Negative for back pain, gait problem, myalgias, neck pain and neck stiffness. Neurological: Negative for dizziness, tremors, seizures, syncope, facial asymmetry, speech difficulty, weakness, light-headedness, numbness and headaches. Psychiatric/Behavioral: Negative for dysphoric mood and sleep disturbance. The patient is not nervous/anxious. Medications:      Allergies:  No Known Allergies    Current Meds:   Scheduled Meds:    sodium chloride flush  5-40 mL IntraVENous 2 times per day    enoxaparin  40 mg SubCUTAneous Daily    insulin lispro  0-6 Units SubCUTAneous TID WC    insulin lispro  0-3 Units SubCUTAneous Nightly     Continuous Infusions:    sodium chloride      sodium chloride 50 mL/hr at 04/09/22 0519    dextrose       PRN Meds: sodium chloride flush, sodium chloride, ondansetron **OR** ondansetron, polyethylene glycol, acetaminophen **OR** acetaminophen, potassium chloride **OR** potassium alternative oral replacement **OR** potassium chloride, glucose, dextrose, glucagon (rDNA), dextrose    Data:     Past Medical History:   has no past medical history on file. Social History:   reports that he has never smoked. He has never used smokeless tobacco. He reports previous alcohol use. He reports that he does not use drugs. Family History: History reviewed. No pertinent family history. Vitals:  BP (!) 148/88   Pulse 97   Temp 98.2 °F (36.8 °C) (Axillary)   Resp 18   Ht 5' 8\" (1.727 m)   Wt 230 lb 13.2 oz (104.7 kg)   SpO2 96%   BMI 35.10 kg/m²   Temp (24hrs), Av.4 °F (36.9 °C), Min:97.7 °F (36.5 °C), Max:99.1 °F (37.3 °C)    Recent Labs     22  1352 22  1725 22  2040 22  0632   POCGLU 260* 187* 179* 161*       I/O(24Hr): Intake/Output Summary (Last 24 hours) at 2022 0801  Last data filed at 2022 0640  Gross per 24 hour   Intake 300 ml   Output --   Net 300 ml       Labs:    [unfilled]    No results found for: SPECIAL  No results found for: CULTURE    [unfilled]    Radiology:    XR CHEST PORTABLE    Result Date: 2022  EXAMINATION: ONE XRAY VIEW OF THE CHEST 2022 9:43 am COMPARISON: None. HISTORY: Reason for Exam: Syncope FINDINGS: The lungs are without acute focal process. No effusion or pneumothorax. The cardiomediastinal silhouette is normal.  The osseous structures are intact without acute process. Negative chest.         Physical Examination:        Physical Exam  Constitutional:       General: He is not in acute distress. Appearance: Normal appearance. He is obese. HENT:      Head: Normocephalic and atraumatic. Comments: Cut over the bridge of the nose     Mouth/Throat:      Mouth: Mucous membranes are dry. Pharynx: Oropharynx is clear. Eyes:      Extraocular Movements: Extraocular movements intact. Conjunctiva/sclera: Conjunctivae normal.      Pupils: Pupils are equal, round, and reactive to light.    Cardiovascular:      Rate and Rhythm: Normal rate and regular rhythm. Pulses: Normal pulses. Heart sounds: Normal heart sounds. Pulmonary:      Effort: Pulmonary effort is normal. No respiratory distress. Breath sounds: Normal breath sounds. No wheezing or rales. Abdominal:      General: Abdomen is flat. There is no distension. Palpations: Abdomen is soft. Tenderness: There is no abdominal tenderness. There is no guarding. Musculoskeletal:         General: Normal range of motion. Cervical back: Normal range of motion and neck supple. Right lower leg: No edema. Left lower leg: No edema. Skin:     General: Skin is warm and dry. Capillary Refill: Capillary refill takes less than 2 seconds. Findings: No bruising. Neurological:      General: No focal deficit present. Mental Status: He is alert and oriented to person, place, and time.    Psychiatric:         Mood and Affect: Mood normal.         Behavior: Behavior normal.           Assessment:        Primary Problem  Syncope and collapse    Active Hospital Problems    Diagnosis Date Noted    Syncope and collapse [R55] 04/08/2022    Type 2 diabetes mellitus without complication, without long-term current use of insulin ([facility] [address] [E11.9] 04/08/2022       Plan:        Syncope  Laying in bed, preceded by feeling of warmth, diaphoresis, abdominal discomfort  Lost consciousness after using the bathroom  Family history of vasovagal syncope  Troponins negative  EKG unremarkable  Cardiac echo completed, pending final results  Telemetry  Orthostats within normal limits  Fall precautions  Most likely attributable to vasovagal, cannot rule out arrhythmia or neurogenic at this time     New diagnosis type 2 diabetes mellitus  Never been diagnosed previously  Blood glucose in the ED over 400  HbA1c 10.3, BHB within normal limits, BHB does not reveal any acidosis  3 units regular insulin  Medium dose insulin sliding scale  10 units Lantus nightly  Hypoglycemic protocol  50 mL/h normal saline for gentle hydration  Most likely will need to be discharged on antidiabetic medication and establish care with a PCP    Dispo: Home when medically stable  DVT prophylaxis: Lovenox 40 subcu daily  Diet: Diabetic diet    Alla Trejo MD  4/9/2022  8:01 AM     Attending Physician Statement  I have discussed the care of Viri Schmid and I have examined the patient myselft and taken ros and hpi , including pertinent history and exam findings,  with the resident. I have reviewed the key elements of all parts of the encounter with the resident. I agree with the assessment, plan and orders as documented by the resident.   65-year-old -American gentleman class II obese BMI 35.10 admitted with feeling dizzy when he was in the restroom new diagnosis of diabetes blood sugar was over 400 likely vasovagal likely dehydration secondary to osmotic dye (from high blood sugar no orthostatic hypotension now hydrated well A1c is over 8.3 will start glimepiride 2 mg twice a day blood sugar check at least 4 times a day  Advised to follow-up with PCP within a week patient will follow up with her mother's PCP  Diet exercise and weight loss advised    Electronically signed by Gerhardt Lovings, MD

## 2022-04-09 NOTE — DISCHARGE INSTR - DIET

## 2022-04-09 NOTE — CARE COORDINATION
CASE MANAGEMENT NOTE:    Admission Date:  4/8/2022 Treva Fox is a 47 y.o.  male    Admitted for : Syncope and collapse [R55]  Hyperglycemia [R73.9]    Met with:  Patient    PCP:  None, but has phone number of PCP to call at discharge                            Insurance:  [address]      Is patient alert and oriented at time of discussion:  Yes    Current Residence/ Living Arrangements:  independently at home             Current Services PTA:  No    Does patient go to outpatient dialysis: No  If yes, location and chair time: NA    Is patient agreeable to VNS: No    Freedom of choice provided:  No    List of [address] provided: No    VNS chosen:  No    DME:  none    Home Oxygen: No    Nebulizer: No    CPAP/BIPAP: No    Supplier: N/A    Potential Assistance Needed: No    SNF needed: No    Freedom of choice and list provided: No    Pharmacy:  [facility] [address]       Does Patient want to use MEDS to BEDS? No    Is patient currently receiving oral anticoagulation therapy? No    Is the Patient an [facility] with Readmission Risk Score greater than 14%? No  If yes, pt needs a follow up appointment made within 7 days. Family Members/Caregivers that pt would like involved in their care:    Yes    If yes, list name here: Mother  Nabeel Yanes and Gaston Tavera    Transportation Provider:  Patient             Discharge Plan:  4/9 [employer] From home with GF, 2 story home. DME: None VNS: none. Pt denies needs for home. 2 decho , new DM diagnosis started on insulin. Needs PCP and has a number to call, will need to call at discharge to set up New pt appt.  Following for needs//JF               Electronically signed by: Farzaneh Morales RN on 4/9/2022 at 9:32 AM

## 2022-04-11 LAB
EKG ATRIAL RATE: 86 BPM
EKG ATRIAL RATE: 87 BPM
EKG P AXIS: 62 DEGREES
EKG P AXIS: 62 DEGREES
EKG P-R INTERVAL: 134 MS
EKG P-R INTERVAL: 136 MS
EKG Q-T INTERVAL: 354 MS
EKG Q-T INTERVAL: 364 MS
EKG QRS DURATION: 76 MS
EKG QRS DURATION: 78 MS
EKG QTC CALCULATION (BAZETT): 425 MS
EKG QTC CALCULATION (BAZETT): 435 MS
EKG R AXIS: 28 DEGREES
EKG R AXIS: 28 DEGREES
EKG T AXIS: 41 DEGREES
EKG T AXIS: 46 DEGREES
EKG VENTRICULAR RATE: 86 BPM
EKG VENTRICULAR RATE: 87 BPM

## 2022-04-11 PROCEDURE — 93010 ELECTROCARDIOGRAM REPORT: CPT | Performed by: INTERNAL MEDICINE

## 2022-04-15 ENCOUNTER — OFFICE VISIT (OUTPATIENT)
Dept: FAMILY MEDICINE CLINIC | Age: 52
End: 2022-04-15
Payer: COMMERCIAL

## 2022-04-15 VITALS
DIASTOLIC BLOOD PRESSURE: 84 MMHG | SYSTOLIC BLOOD PRESSURE: 118 MMHG | OXYGEN SATURATION: 96 % | HEIGHT: 68 IN | TEMPERATURE: 98.4 F | WEIGHT: 224.6 LBS | BODY MASS INDEX: 34.04 KG/M2 | HEART RATE: 68 BPM

## 2022-04-15 DIAGNOSIS — Z12.11 SCREEN FOR COLON CANCER: ICD-10-CM

## 2022-04-15 DIAGNOSIS — I51.7 LVH (LEFT VENTRICULAR HYPERTROPHY): ICD-10-CM

## 2022-04-15 DIAGNOSIS — Z13.220 SCREENING FOR HYPERLIPIDEMIA: ICD-10-CM

## 2022-04-15 DIAGNOSIS — Z11.59 ENCOUNTER FOR SCREENING FOR OTHER VIRAL DISEASES: ICD-10-CM

## 2022-04-15 DIAGNOSIS — Z12.5 PROSTATE CANCER SCREENING: ICD-10-CM

## 2022-04-15 DIAGNOSIS — R94.31 ABNORMAL EKG: ICD-10-CM

## 2022-04-15 DIAGNOSIS — E11.65 TYPE 2 DIABETES MELLITUS WITH HYPERGLYCEMIA, WITHOUT LONG-TERM CURRENT USE OF INSULIN (HCC): Primary | ICD-10-CM

## 2022-04-15 PROBLEM — R55 SYNCOPE AND COLLAPSE: Status: RESOLVED | Noted: 2022-04-08 | Resolved: 2022-04-15

## 2022-04-15 PROCEDURE — 1111F DSCHRG MED/CURRENT MED MERGE: CPT | Performed by: FAMILY MEDICINE

## 2022-04-15 PROCEDURE — 99204 OFFICE O/P NEW MOD 45 MIN: CPT | Performed by: FAMILY MEDICINE

## 2022-04-15 PROCEDURE — 3046F HEMOGLOBIN A1C LEVEL >9.0%: CPT | Performed by: FAMILY MEDICINE

## 2022-04-15 RX ORDER — LISINOPRIL 2.5 MG/1
2.5 TABLET ORAL DAILY
Qty: 90 TABLET | Refills: 1 | Status: SHIPPED | OUTPATIENT
Start: 2022-04-15 | End: 2022-08-25 | Stop reason: ALTCHOICE

## 2022-04-15 RX ORDER — GLIMEPIRIDE 2 MG/1
2 TABLET ORAL
Qty: 180 TABLET | Refills: 3 | Status: SHIPPED | OUTPATIENT
Start: 2022-04-15 | End: 2022-09-02 | Stop reason: SDUPTHER

## 2022-04-15 RX ORDER — ASPIRIN 81 MG/1
81 TABLET ORAL DAILY
Qty: 90 TABLET | Refills: 1
Start: 2022-04-15 | End: 2022-08-25 | Stop reason: SDUPTHER

## 2022-04-15 RX ORDER — METFORMIN HYDROCHLORIDE 500 MG/1
500 TABLET, EXTENDED RELEASE ORAL 2 TIMES DAILY
Qty: 180 TABLET | Refills: 3 | Status: SHIPPED | OUTPATIENT
Start: 2022-04-15 | End: 2022-09-02 | Stop reason: SDUPTHER

## 2022-04-15 SDOH — ECONOMIC STABILITY: FOOD INSECURITY: WITHIN THE PAST 12 MONTHS, THE FOOD YOU BOUGHT JUST DIDN'T LAST AND YOU DIDN'T HAVE MONEY TO GET MORE.: NEVER TRUE

## 2022-04-15 SDOH — ECONOMIC STABILITY: FOOD INSECURITY: WITHIN THE PAST 12 MONTHS, YOU WORRIED THAT YOUR FOOD WOULD RUN OUT BEFORE YOU GOT MONEY TO BUY MORE.: NEVER TRUE

## 2022-04-15 ASSESSMENT — ENCOUNTER SYMPTOMS
WHEEZING: 0
ABDOMINAL PAIN: 0
COUGH: 0
SHORTNESS OF BREATH: 0
VOMITING: 0
ABDOMINAL DISTENTION: 0
CHEST TIGHTNESS: 0
DIARRHEA: 0
NAUSEA: 0
CONSTIPATION: 0

## 2022-04-15 ASSESSMENT — PATIENT HEALTH QUESTIONNAIRE - PHQ9
2. FEELING DOWN, DEPRESSED OR HOPELESS: 0
SUM OF ALL RESPONSES TO PHQ QUESTIONS 1-9: 0
1. LITTLE INTEREST OR PLEASURE IN DOING THINGS: 0
SUM OF ALL RESPONSES TO PHQ QUESTIONS 1-9: 0
SUM OF ALL RESPONSES TO PHQ9 QUESTIONS 1 & 2: 0

## 2022-04-15 ASSESSMENT — SOCIAL DETERMINANTS OF HEALTH (SDOH): HOW HARD IS IT FOR YOU TO PAY FOR THE VERY BASICS LIKE FOOD, HOUSING, MEDICAL CARE, AND HEATING?: NOT HARD AT ALL

## 2022-04-15 NOTE — PROGRESS NOTES
Visit Information    Have you changed or started any medications since your last visit including any over-the-counter medicines, vitamins, or herbal medicines? NO   Have you stopped taking any of your medications? Is so, why? -  no  Are you having any side effects from any of your medications? - no    Have you seen any other physician or provider since your last visit?  no   Have you had any other diagnostic tests since your last visit?  no   Have you been seen in the emergency room and/or had an admission in a hospital since we last saw you?  no   Have you had your routine dental cleaning in the past 6 months?  no     Do you have an active MyChart account? If no, what is the barrier?   Yes    No care team member to display    Medical History Review  Past Medical, Family, and Social History reviewed and does contribute to the patient presenting condition    Health Maintenance   Topic Date Due    Hepatitis C screen  Never done    COVID-19 Vaccine (1) Never done    Pneumococcal 0-64 years Vaccine (1 - PCV) Never done    Diabetic foot exam  Never done    Lipid screen  Never done    HIV screen  Never done    Diabetic microalbuminuria test  Never done    Diabetic retinal exam  Never done    Hepatitis B vaccine (1 of 3 - Risk 3-dose series) Never done    DTaP/Tdap/Td vaccine (1 - Tdap) Never done    Colorectal Cancer Screen  Never done    Shingles Vaccine (1 of 2) Never done    A1C test (Diabetic or Prediabetic)  07/08/2022    Flu vaccine (Season Ended) 09/01/2022    Depression Screen  04/15/2023    Hepatitis A vaccine  Aged Out    Hib vaccine  Aged Out    Meningococcal (ACWY) vaccine  Aged Out

## 2022-04-15 NOTE — PROGRESS NOTES
Marla Sharpe (:  1970) is a 47 y.o. male,Established patient, here for evaluation of the following chief complaint(s): Establish Care, Follow-Up from Hospital ([address] [facility]), and Diabetes      ASSESSMENT/PLAN:    1. Type 2 diabetes mellitus with hyperglycemia, without long-term current use of insulin ([address]  Newly diagnosed, uncontrolled, ongoing  Lab Results   Component Value Date    LABA1C 10.3 (H) 2022       -      DIABETES FOOT EXAM  -     [facility]  -To start   metFORMIN (GLUCOPHAGE-XR) 500 MG extended release tablet; Take 1 tablet by mouth in the morning and at bedtime With food, Disp-180 tablet, R-3Normal  -To start   dapagliflozin (FARXIGA) 10 MG tablet; Take 1 tablet by mouth every morning, Disp-90 tablet, R-3Normal  -To continue    glimepiride (AMARYL) 2 MG tablet; Take 1 tablet by mouth 2 times daily (before meals), Disp-180 tablet, R-3Normal  -     Microalbumin / Creatinine Urine Ratio; Future  -     TSH; Future  -     Basic Metabolic Panel; Future  -     Vitamin B12 & Folate; Future  -   To start aspirin EC 81 MG EC tablet; Take 1 tablet by mouth daily, Disp-90 tablet, R-1NO PRINT  -To start   lisinopril (PRINIVIL;ZESTRIL) 2.5 MG tablet; Take 1 tablet by mouth daily, Disp-90 tablet, R-1Normal-most common side effect dry cough discussed with patient. Most worrisome side effect angioedema, discussed with patient and his girlfriend, and if it ever happens, he will go to emergency room. The patient verbalizes understanding and agrees with the plan. VT DISCHARGE MEDS RECONCILED W/ CURRENT OUTPATIENT MED LIST [1111F]     2. LVH (left ventricular hypertrophy)  Newly found on echo2D, most likely fluctuating blood pressure  -start     lisinopril (PRINIVIL;ZESTRIL) 2.5 MG tablet; Take 1 tablet by mouth daily, Disp-90 tablet, R-1Normal  Benefits for prevention of nephropathy and for better blood pressure control discussed, he is agreeable.     VT DISCHARGE MEDS RECONCILED W/ CURRENT OUTPATIENT MED LIST [1111F]     3. Abnormal EKG  Newly found  Due to multiple risk factors, he will need full cardiac evaluation  -     [facility] - Román Uriostegui DO, Cardiology, [address] [place] [address] MED LIST [1111F]     4. Screening for hyperlipidemia  -     Lipid, Fasting; Future  NE DISCHARGE MEDS RECONCILED W/ CURRENT OUTPATIENT MED LIST [1111F]     5. Encounter for screening for other viral diseases  -     Hepatitis C Antibody; Future  -     HIV Screen; Future  -     Hepatitis B Surface Antibody; Future  NE DISCHARGE MEDS RECONCILED W/ CURRENT OUTPATIENT MED LIST [1111F]     6. Prostate cancer screening  -     PSA Screening; Future   The natural history of prostate cancer and ongoing controversy regarding screening and potential treatment outcomes of prostate cancer has been discussed with the patient. The meaning of a false positive PSA and a false negative PSA has been discussed. He indicates understanding of the limitations of this screening test and wishes to proceed with screening PSA testing. NE DISCHARGE MEDS RECONCILED W/ CURRENT OUTPATIENT MED LIST [1111F]     7. Screen for colon cancer  -     Carlotta Herrera DO, General Surgery, [place]  NE DISCHARGE MEDS RECONCILED W/ CURRENT OUTPATIENT MED LIST [1111F]     Derik Young received counseling on the following healthy behaviors: nutrition, exercise, medication adherence and weight loss    Reviewed prior labs and health maintenance  Discussed use, benefit, and side effects of prescribed medications. Barriers to medication compliance addressed. Patient given educational materials - see patient instructions  All patient questions answered. Patient voiced understanding. The patient's past medical,surgical, social, and family history as well as his current medications and allergies were reviewed as documented in today's encounter.     Medications, labs, diagnostic studies, consultations and follow-up as documented in this encounter. Return in about 3 months (around 7/15/2022) for **POC A1C, diabetes, LABS F/U, REFERRALS . Data Unavailable    Future Appointments   Date Time Provider Dannie Brionesisti   7/15/2022 10:30 AM Lilly Hobbs MD [id number]           Prior to Visit Medications    Medication Sig Taking? Authorizing Provider   glimepiride (AMARYL) 2 MG tablet Take 1 tablet by mouth 2 times daily (before meals) Yes Ariadna Reddy MD   glucose monitoring (FREESTYLE FREEDOM) kit 1 kit by Does not apply route daily Yes Ariadna Reddy MD   blood glucose monitor strips Test two times a day & as needed for symptoms of irregular blood glucose. Dispense sufficient amount for indicated testing frequency plus additional to accommodate PRN testing needs. Yes Ariadna Reddy MD   Insulin Pen Needle (MEIJER PEN NEEDLES) 31G X 6 MM MISC 1 each by Does not apply route daily Yes Ariadna Reddy MD       No Known Allergies    Past Medical History:   Diagnosis Date    Syncope and collapse 4/8/2022    Type 2 diabetes mellitus with hyperglycemia, without long-term current use of insulin ([facility] 4/8/2022       Past Surgical History:   Procedure Laterality Date    KNEE SURGERY  2002    ACL, MCL       Family History   Problem Relation Age of Onset    Diabetes Mother     Diabetes Maternal Aunt     Breast Cancer Maternal Cousin     Colon Cancer Neg Hx     Prostate Cancer Neg Hx        Social History     Tobacco Use    Smoking status: Never Smoker    Smokeless tobacco: Never Used   Vaping Use    Vaping Use: Not on file   Substance Use Topics    Alcohol use: Yes     Comment: during [facility] Drug use: Never         SUBJECTIVE/OBJECTIVE:    Prior PCP:NONE    Comes with his significant-other, Radha, who took notes and asked questions. Went to hospital for passing out found high Blood Glucose and uncontrolled diabetes.     Patient was admitted 4/8/2022 through 4/9/2022 at [facility] due to syncope and collapse, found to have uncontrolled diabetes, with blood glucose 415, with reactive hyponatremia 132. A1c in the hospital was 10.3. Records reviewed through epic, showed beta hydroxybutyric normal at 0.10  Troponin level was normal  Blood gas the nose was abnormal with low oxygen saturation and carbon dioxide. While in the hospital he was treated with Humalog and Lantus, but discharged home with glimepiride 2 mg twice a day which he has been taking, denies side effects. In the hospital cardiac work-up was done showing mild left ventricular hypertrophy otherwise normal  EKG was abnormal showing septal infarct. He denies ever having a heart attack or chest pain. Chest x-ray was normal.    Reviewed echo 2D with patient and explained to him mild LVH    Copy from epic  CONCLUSIONS Summary Left ventricle is normal in size. Mild left ventricular hypertrophy. Global left ventricular systolic function is normal. Estimated LV EF >55 %. No obvious wall motion abnormality seen. Left atrium is normal in size. No significant valvular regurgitation or stenosis seen. Signature ----------------------------------------------------------------------------     Reviewed with patient EKG done in the hospital explained to him that he might have had a silent heart attack        Copy from epic  Narrative & Impression    Normal sinus rhythm  Possible Left atrial enlargement  Septal infarct , age undetermined  Abnormal ECG  No previous ECGs available      Specimen Collected: 04/08/22 10:02        BP controlled today  when he went to the hospital high systolic blood pressure noted. He denies chest pain, shortness of breath or feeling tired    BP Readings from Last 3 Encounters:   04/15/22 118/84   04/09/22 (!) 142/87       He is here today to mainly address newly diagnosed type II diabetes mellitus. His mom who is also my patient, has type 2 diabetes.   Since discharge from the hospital, Home Blood Glucose 170, 215, 151 since discharged from the hospital   Testing 2 times a days    Patient reports prior to the diagnosis of type 2 diabetes, was drinking a little bit of pop, regular gatorade, and apple juice. Currently, he mostly drinks water and has been eating healthier. Currently, he denies increased thirst or blurry vision    A1c very high uncontrolled  Lab Results   Component Value Date    LABA1C 10.3 (H) 04/08/2022     I advised Romario Eisenbergion to make appointment with  Eye doctor. The patient verbalizes understanding and agrees with the plan. Jaime Man is due for HIV screening due to CDC recommendation to be screened. Jaime Man denies high risk behavior. Patient is due for hepatitis C screening. Jaime Man 's indication is CDC recommendation. Patient is due for hepatitis B antibodies check. his indication is diabetes mellitus. Jaime Man does not remember if he was ever vaccinated against hepatitis B. Due for lipids screening. Due to age and diabetes, Jaime Man is due for lipids screening. Jaime Man is  eating low fat diet. he is not exercising, but staying active  he is not taking any over the counter supplements. Patient is due for colon cancer screening. Jaime Man denies nausea, vomiting, diarrhea, constipation, blood in the stool or abdominal pain. We discussed options, he would like to have: colonoscopy. The natural history of prostate cancer and ongoing controversy regarding screening and potential treatment outcomes of prostate cancer has been discussed with the patient. The meaning of a false positive PSA and a false negative PSA has been discussed. He indicates understanding of the limitations of this screening test and wishes to proceed with screening PSA testing. [x]Negative depression screening. PHQ Scores 4/15/2022   PHQ2 Score 0   PHQ9 Score 0       Review of Systems   Constitutional: Positive for fatigue.  Negative for activity change, appetite change, chills, diaphoresis, fever and unexpected weight change. Eyes: Negative for visual disturbance. Respiratory: Negative for cough, chest tightness, shortness of breath and wheezing. Cardiovascular: Negative for chest pain, palpitations and leg swelling. Gastrointestinal: Negative for abdominal distention, abdominal pain, constipation, diarrhea, nausea and vomiting. Endocrine: Negative for cold intolerance, heat intolerance, polydipsia, polyphagia and polyuria. Genitourinary: Negative for difficulty urinating and frequency. Allergic/Immunologic: Positive for immunocompromised state (due to uncontrolled diabetes). Neurological: Negative for numbness. Hematological: Does not bruise/bleed easily. Psychiatric/Behavioral: Negative for dysphoric mood and sleep disturbance. The patient is not nervous/anxious.          -vital signs stable and within normal limits except obesity per BMI    /84   Pulse 68   Temp 98.4 °F (36.9 °C)   Ht 5' 8\" (1.727 m)   Wt 224 lb 9.6 oz (101.9 kg)   SpO2 96%   BMI 34.15 kg/m²        Physical Exam  Vitals and nursing note reviewed. Constitutional:       General: He is not in acute distress. Appearance: Normal appearance. He is well-developed. He is obese. He is not diaphoretic. HENT:      Head: Normocephalic and atraumatic. Right Ear: External ear normal.      Left Ear: External ear normal.      Mouth/Throat:      Comments: I did not examine the mouth due to coronavirus pandemic and wearing masks. Eyes:      General: Lids are normal. No scleral icterus. Right eye: No discharge. Left eye: No discharge. Extraocular Movements: Extraocular movements intact. Conjunctiva/sclera: Conjunctivae normal.   Neck:      Thyroid: No thyromegaly. Cardiovascular:      Rate and Rhythm: Normal rate and regular rhythm. Pulses:           Dorsalis pedis pulses are 2+ on the right side and 2+ on the left side.         Posterior tibial pulses are 2+ on the right side and 2+ on the left side. Heart sounds: Normal heart sounds. No murmur heard. Pulmonary:      Effort: Pulmonary effort is normal. No respiratory distress. Breath sounds: Normal breath sounds. No wheezing or rales. Chest:      Chest wall: No tenderness. Abdominal:      General: Bowel sounds are normal. There is no distension. Palpations: Abdomen is soft. There is no hepatomegaly or splenomegaly. Tenderness: There is no abdominal tenderness. Comments: Obese abdomen. Musculoskeletal:         General: No tenderness. Normal range of motion. Cervical back: Normal range of motion and neck supple. Right lower leg: No edema. Left lower leg: No edema. Right foot: Normal range of motion. No deformity, bunion, Charcot foot, foot drop or prominent metatarsal heads. Left foot: Normal range of motion. No deformity, bunion, Charcot foot, foot drop or prominent metatarsal heads. Feet:      Right foot:      Protective Sensation: 5 sites tested. 5 sites sensed. Skin integrity: Callus (right great toe, has been applying over-the-counter callus solution) present. Toenail Condition: Right toenails are normal.      Left foot:      Protective Sensation: 5 sites tested. 5 sites sensed. Skin integrity: Skin integrity normal. No callus. Toenail Condition: Left toenails are normal.   Lymphadenopathy:      Cervical: No cervical adenopathy. Skin:     General: Skin is warm and dry. Capillary Refill: Capillary refill takes less than 2 seconds. Findings: No rash. Neurological:      Mental Status: He is alert and oriented to person, place, and time. Deep Tendon Reflexes: Reflexes are normal and symmetric. Psychiatric:         Mood and Affect: Mood normal.         Behavior: Behavior normal.         Thought Content:  Thought content normal.         Judgment: Judgment normal.           I personally reviewed testing with patient, and all questions answered.   Hyperglycemia  Mildly low potassium   otherwise labs within normal limits       Lab Results   Component Value Date    WBC 4.0 04/08/2022    HGB 16.3 04/08/2022    HCT 48.9 04/08/2022    MCV 94.0 04/08/2022     04/08/2022       Lab Results   Component Value Date     04/09/2022    K 3.6 04/09/2022     04/09/2022    CO2 23 04/09/2022    BUN 11 04/09/2022    CREATININE 0.76 04/09/2022    GLUCOSE 176 04/09/2022    CALCIUM 8.5 04/09/2022        Lab Results   Component Value Date    ALT 23 04/08/2022    AST 20 04/08/2022    ALKPHOS 95 04/08/2022    BILITOT 0.36 04/08/2022       No results found for: TSHFT4, TSH    No results found for: CHOL  No results found for: TRIG  No results found for: HDL  No results found for: LDLCALC, LDLCHOLESTEROL  No results found for: CHOLHDLRATIO      No results found for: [id number]  No results found for: FOLATE  No results found for: VITD25      Orders Placed This Encounter   Medications    metFORMIN (GLUCOPHAGE-XR) 500 MG extended release tablet     Sig: Take 1 tablet by mouth in the morning and at bedtime With food     Dispense:  180 tablet     Refill:  3    dapagliflozin (FARXIGA) 10 MG tablet     Sig: Take 1 tablet by mouth every morning     Dispense:  90 tablet     Refill:  3    glimepiride (AMARYL) 2 MG tablet     Sig: Take 1 tablet by mouth 2 times daily (before meals)     Dispense:  180 tablet     Refill:  3    aspirin EC 81 MG EC tablet     Sig: Take 1 tablet by mouth daily     Dispense:  90 tablet     Refill:  1    lisinopril (PRINIVIL;ZESTRIL) 2.5 MG tablet     Sig: Take 1 tablet by mouth daily     Dispense:  90 tablet     Refill:  1       Orders Placed This Encounter   Procedures    Lipid, Fasting     Standing Status:   Future     Standing Expiration Date:   5/7/2022    Hepatitis C Antibody     Standing Status:   Future     Standing Expiration Date:   5/7/2022    HIV Screen     Standing Status:   Future     Standing Expiration Date: 5/7/2022    Microalbumin / Creatinine Urine Ratio     Standing Status:   Future     Standing Expiration Date:   5/7/2022    Hepatitis B Surface Antibody     Standing Status:   Future     Standing Expiration Date:   5/7/2022    PSA Screening     Standing Status:   Future     Standing Expiration Date:   5/7/2022    TSH     Standing Status:   Future     Standing Expiration Date:   5/7/2022    Basic Metabolic Panel     Standing Status:   Future     Standing Expiration Date:   5/7/2022    Vitamin B12 & Folate     Standing Status:   Future     Standing Expiration Date:   5/7/2022    CAMILLE - Yojana Desai DO, Cardiology, [facility]     Referral Priority:   Routine     Referral Type:   Eval and Treat     Referral Reason:   Specialty Services Required     Referred to Provider:   Kita Gresham DO     Requested Specialty:   Cardiology     Number of Visits Requested:   [address] [facility] Diabetes Education [facility]     Referral Priority:   Routine     Referral Type:   Eval and Treat     Referral Reason:   Specialty Services Required     Number of Visits Requested:   [address] [place] [address] Jazzy Kasper DO, General Surgery, [place]     Referral Priority:   Routine     Referral Type:   Eval and Treat     Referral Reason:   Specialty Services Required     Referred to Provider:   Jermaine Miguel DO     Requested Specialty:   General Surgery     Number of Visits Requested:   1     DIABETES FOOT EXAM    NC DISCHARGE MEDS RECONCILED W/ CURRENT OUTPATIENT MED LIST       Medications Discontinued During This Encounter   Medication Reason    glimepiride (AMARYL) 2 MG tablet REORDER         On this date 4/15/2022 I have spent 50 minutes reviewing previous notes, test results and face to face with the patient discussing the diagnosis and importance of compliance with the treatment plan as well as documenting on the day of the visit.        This note was completed by using the assistance of a speech-recognition program. However, inadvertent computerized transcription errors may be present. Although every effort was made to ensure accuracy, no guarantees can be provided that every mistake has been identified and corrected by editing. An electronic signature was used to authenticate this note.   Electronically signed by Tray Lerner MD on 4/15/2022 at 5:20 PM

## 2022-04-15 NOTE — PATIENT INSTRUCTIONS
Patient Education        Learning About Carbohydrate (Carb) Counting and Eating Out When You Have Diabetes  Why plan your meals? Meal planning can be a key part of managing diabetes. Planning meals and snacks with the right balance of carbohydrate, protein, and fat can help you keep yourblood sugar at the target level you set with your doctor. You don't have to eat special foods. You can eat what your family eats, including sweets once in a while. But you do have to pay attention to how oftenyou eat and how much you eat of certain foods. You may want to work with a dietitian or a diabetes educator. They can give you tips and meal ideas and can answer your questions about meal planning. This health professional can also help you reach a healthy weight if that is one ofyour goals. What should you know about eating carbs? Managing the amount of carbohydrate (carbs) you eat is an important part ofhealthy meals when you have diabetes. Carbohydrate is found in many foods.  Learn which foods have carbs. And learn the amounts of carbs in different foods. ? Bread, cereal, pasta, and rice have about 15 grams of carbs in a serving. A serving is 1 slice of bread (1 ounce), ½ cup of cooked cereal, or 1/3 cup of cooked pasta or rice. ? Fruits have 15 grams of carbs in a serving. A serving is 1 small fresh fruit, such as an apple or orange; ½ of a banana; ½ cup of cooked or canned fruit; ½ cup of fruit juice; 1 cup of melon or raspberries; or 2 tablespoons of dried fruit. ? Milk and no-sugar-added yogurt have 15 grams of carbs in a serving. A serving is 1 cup of milk or 3/4 cup (6 oz) of no-sugar-added yogurt. ? Starchy vegetables have 15 grams of carbs in a serving. A serving is ½ cup of mashed potatoes or sweet potato; 1 cup winter squash; ½ of a small baked potato; ½ cup of cooked beans; or ½ cup cooked corn or green peas.    Learn how much carbs to eat each day and at each meal. A dietitian or certified diabetes educator can teach you how to keep track of the amount of carbs you eat. This is called carbohydrate counting.  If you are not sure how to count carbohydrate grams, use the plate method to plan meals. It is a quick way to make sure that you have a balanced meal. It also can help you manage the amount of carbohydrate you eat at meals. ? Divide your plate by types of foods. Put non-starchy vegetables on half the plate, meat or other protein food on one-quarter of the plate, and a grain or starchy vegetable in the final quarter of the plate. To this you can add a small piece of fruit and 1 cup of milk or yogurt, depending on how many carbs you are supposed to eat at a meal.   Try to eat about the same amount of carbs at each meal. Do not \"save up\" your daily allowance of carbs to eat at one meal.   Proteins have very little or no carbs. Examples of proteins are beef, chicken, turkey, fish, eggs, tofu, cheese, cottage cheese, and peanut butter. How can you eat out and still eat healthy?  Learn to estimate the serving sizes of foods that have carbohydrate. If you measure food at home, it will be easier to estimate the amount in a serving of restaurant food.  If the meal you order has too much carbohydrate (such as potatoes, corn, or baked beans), ask to have a low-carbohydrate food instead. Ask for a salad or non-starchy vegetables like broccoli, cauliflower, green beans, or peppers.  If you eat more carbohydrate at a meal than you had planned, take a walk or do other exercise. This will help lower your blood sugar. What are some tips for eating healthy?  Limit saturated fat, such as the fat from meat and dairy products. This is a healthy choice because people who have diabetes are at higher risk of heart disease. So choose lean cuts of meat and nonfat or low-fat dairy products. Use olive or canola oil instead of butter or shortening when cooking.  Don't skip meals.  Your blood sugar may drop too low if you skip meals and take insulin or certain medicines for diabetes.  Check with your doctor before you drink alcohol. Alcohol can cause your blood sugar to drop too low. Alcohol can also cause a bad reaction if you take certain diabetes medicines. Follow-up care is a key part of your treatment and safety. Be sure to make and go to all appointments, and call your doctor if you are having problems. It's also a good idea to know your test results and keep alist of the medicines you take. Where can you learn more? Go to https://BlackLight PowerpeJack On Block.XYZE. org and sign in to your TranscribeMe account. Enter P085 in the HomeSpace box to learn more about \"Learning About Carbohydrate (Carb) Counting and Eating Out When You Have Diabetes. \"     If you do not have an account, please click on the \"Sign Up Now\" link. Current as of: September 8, 2021               Content Version: 13.2  © 6474-0136 [employer]. Care instructions adapted under license by [facility] [place] ([facility]). If you have questions about a medical condition or this instruction, always ask your healthcare professional. [place] [address] any warranty or liability for your use of this information. Patient Education        Diabetes Blood Sugar Emergencies: Your Action Plan  How can you prevent a blood sugar emergency? An important part of living with diabetes is keeping your blood sugar in your target range. You'll need to know what to do if it's too high or too low. Managing your blood sugar levels helps you avoid emergencies. This care sheet will teach you about the signs of high and low blood sugar. It will help youmake an action plan with your doctor for when these signs occur. Low blood sugar is more likely to happen if you take certain medicines for diabetes. It canalso happen if you skip a meal, drink alcohol, or exercise more than usual.  You may get high blood sugar if you eat differently than you normally do. One example is eating more carbohydrate than usual. Having a cold, the flu, or other sudden illness can also cause high blood sugar levels. Levels can also rise if you miss a dose ofmedicine. Any change in how you take your medicine may affect your blood sugar level. Soit's important to work with your doctor before you make any changes. Track your blood sugar  Work with your doctor to fill in the blank spaces below that apply to you. Track your levels, know your target range, and write down ways you can get your blood sugar back in your target range. A log book can help you track yourlevels. Take the book to all of your medical appointments.  Check your blood sugar _____ times a day, at these times:________________________________________________. (For example: Before meals, at bedtime, before exercise, during exercise, other.)   Your blood sugar target range before a meal is ___________________. Your blood sugar target range after a meal is _______________________.  Do this--___________________________________________________--to get your blood sugar back within your safe range if your blood sugar results are _________________________________________. (For example: Less than 70 or above 250 mg/dL.)  Call your doctor when your blood sugar results are ___________________________________. (For example: Less than 70 or above 250 mg/dL.)  What are the symptoms of low and high blood sugar? Common symptoms of low blood sugar are sweating and feeling shaky, weak, hungry, or confused. Symptoms can startquickly. Common symptoms of high blood sugar are feeling very thirsty or very hungry. You may also pass urine more oftenthan usual. You may have blurry vision and may lose weight without trying. But some people may have high or low blood sugar without having any symptoms. That's a good reason to check your blood sugar on a regular schedule. What should you do if you have symptoms?   Work with your doctor to fill in the blank spaces below that apply to you. Low blood sugar and \"the rule of 15\"  If you have symptoms of low blood sugar, check your blood sugar. If it's below _____ ( for example, below 70), eat or drink a quick-sugar food that has about 15 grams of carbohydrate. Your goal is to get your level back to your safe range. Check your blood sugar again 15 minutes later. If it's still not in your target range, take another 15 grams of carbohydrate and check your blood sugar again in 15 minutes. Repeatthis until you reach your target. Then go back to your regular testing schedule. Children usually need less than 15 grams of carbohydrate. Check with yourdoctor or diabetes educator for the amount that is right for your child. When you have low blood sugar, it's best to stop or reduce any physical activity until your blood sugar is back in your target range and is stable. If you must stay active, eat or drink 30 grams of carbohydrate. Then check your blood sugar again in 15 minutes. If it's not in your target range, take another 30 grams of carbohydrates. Check your blood sugar again in 15 minutes. Keep doing this until you reach your target. You can then go back to your regulartesting schedule. If your symptoms or blood sugar levels are getting worse or have not improved after 15 minutes, seek medical care right away. If you take insulin, always carry a glucagon emergency kit. Be sure your family, friends, and coworkers know how to give glucagon. Here are some examples of quick-sugar foods with 15 grams of carbohydrate:   3 or 4 glucose tablets   1 tablespoon (3 teaspoons) table sugar   ½ cup to ¾ cup (4 to 6 ounces) of fruit juice or regular (not diet) soda   Hard candy (such as 6 Life Savers)  High blood sugar  If you have symptoms of high blood sugar, check your blood sugar. Your goal is to get your level back to your target range.  If it's above ______ ( for example, above 250), follow these steps:   If you missed a dose of your diabetes medicine, take it now. Take only the amount of medicine that you have been prescribed. Do not take more or less medicine.  Give yourself insulin if your doctor has prescribed it for high blood sugar.  Test for ketones, if the doctor told you to do so. If the results of the ketone test show a moderate-to-large amount of ketones, call the doctor for advice.  Wait 30 minutes after you take the extra insulin or the missed medicine. Check your blood sugar again. If your symptoms or blood sugar levels are getting worse or have not improved after taking these steps, seek medical care right away. Follow-up care is a key part of your treatment and safety. Be sure to make and go to all appointments, and call your doctor if you are having problems. It's also a good idea to know your test results and keep alist of the medicines you take. Where can you learn more? Go to https://CirrascalepeBMdr.SpreadShout. org and sign in to your Appota account. Enter L854 in the Mobshop box to learn more about \"Diabetes Blood Sugar Emergencies: Your Action Plan. \"     If you do not have an account, please click on the \"Sign Up Now\" link. Current as of: July 28, 2021               Content Version: 13.2  © 2006-2022 Healthwise[employer]. Care instructions adapted under license by [facility] ([facility]). If you have questions about a medical condition or this instruction, always ask your healthcare professional. [place] [address] any warranty or liability for your use of this information.

## 2022-04-20 ENCOUNTER — TELEPHONE (OUTPATIENT)
Dept: SURGERY | Age: 52
End: 2022-04-20

## 2022-04-26 ENCOUNTER — TELEPHONE (OUTPATIENT)
Dept: SURGERY | Age: 52
End: 2022-04-26

## 2022-05-05 ENCOUNTER — TELEPHONE (OUTPATIENT)
Dept: SURGERY | Age: 52
End: 2022-05-05

## 2022-08-25 ENCOUNTER — TELEPHONE (OUTPATIENT)
Dept: FAMILY MEDICINE CLINIC | Age: 52
End: 2022-08-25

## 2022-08-25 ENCOUNTER — TELEMEDICINE (OUTPATIENT)
Dept: FAMILY MEDICINE CLINIC | Age: 52
End: 2022-08-25
Payer: COMMERCIAL

## 2022-08-25 DIAGNOSIS — Z11.59 ENCOUNTER FOR SCREENING FOR OTHER VIRAL DISEASES: ICD-10-CM

## 2022-08-25 DIAGNOSIS — I51.7 LVH (LEFT VENTRICULAR HYPERTROPHY): ICD-10-CM

## 2022-08-25 DIAGNOSIS — Z23 ENCOUNTER FOR IMMUNIZATION: ICD-10-CM

## 2022-08-25 DIAGNOSIS — E11.65 TYPE 2 DIABETES MELLITUS WITH HYPERGLYCEMIA, WITHOUT LONG-TERM CURRENT USE OF INSULIN (HCC): Primary | ICD-10-CM

## 2022-08-25 DIAGNOSIS — Z12.5 PROSTATE CANCER SCREENING: ICD-10-CM

## 2022-08-25 DIAGNOSIS — E55.9 VITAMIN D DEFICIENCY: ICD-10-CM

## 2022-08-25 DIAGNOSIS — N52.9 ERECTILE DYSFUNCTION, UNSPECIFIED ERECTILE DYSFUNCTION TYPE: ICD-10-CM

## 2022-08-25 PROCEDURE — 99214 OFFICE O/P EST MOD 30 MIN: CPT | Performed by: FAMILY MEDICINE

## 2022-08-25 PROCEDURE — 3044F HG A1C LEVEL LT 7.0%: CPT | Performed by: FAMILY MEDICINE

## 2022-08-25 RX ORDER — LOSARTAN POTASSIUM 25 MG/1
25 TABLET ORAL DAILY
Qty: 90 TABLET | Refills: 3 | Status: SHIPPED | OUTPATIENT
Start: 2022-08-25

## 2022-08-25 RX ORDER — GLUCOSAMINE HCL/CHONDROITIN SU 500-400 MG
CAPSULE ORAL
Qty: 300 STRIP | Refills: 3 | Status: SHIPPED | OUTPATIENT
Start: 2022-08-25

## 2022-08-25 RX ORDER — SILDENAFIL 50 MG/1
50 TABLET, FILM COATED ORAL PRN
Qty: 30 TABLET | Refills: 3 | Status: SHIPPED | OUTPATIENT
Start: 2022-08-25 | End: 2022-09-02 | Stop reason: SDUPTHER

## 2022-08-25 RX ORDER — ASPIRIN 81 MG/1
81 TABLET ORAL DAILY
Qty: 90 TABLET | Refills: 3 | Status: SHIPPED | OUTPATIENT
Start: 2022-08-25

## 2022-08-25 ASSESSMENT — ENCOUNTER SYMPTOMS
ABDOMINAL PAIN: 0
ABDOMINAL DISTENTION: 0
CHEST TIGHTNESS: 0
WHEEZING: 0
DIARRHEA: 0
VOMITING: 0
SHORTNESS OF BREATH: 0
COUGH: 1
CONSTIPATION: 0
NAUSEA: 0

## 2022-08-25 NOTE — PROGRESS NOTES
[address]  Send Link Via Text  No text has been sent  Last text sent2022 9:40 AM  [phone]  Email  Send Link Via Email  No email has been sent  Last email sent2022 9:40 AM  To:[email or web address]      Mychart message           2022    TELEHEALTH EVALUATION -- Audio/Visual (During QSBXP-44 public health emergency)      Marylee Felts (:  1970) is a 47 y.o. male,Established patient, here for evaluation of the following chief complaint(s): Diabetes        ASSESSMENT/PLAN:    1. Type 2 diabetes mellitus with hyperglycemia, without long-term current use of insulin (HCC)  Inadequately controlled    Lab Results   Component Value Date    LABA1C 10.3 (H) 2022   Recheck labs  -     CBC; Future  -     Comprehensive Metabolic Panel; Future  -     Hemoglobin A1C; Future  -     Vitamin B12 & Folate; Future  -     Uric Acid; Future  -     TSH; Future  -     Microalbumin / Creatinine Urine Ratio; Future  -     Magnesium; Future  -    start aspirin EC 81 MG EC tablet; Take 1 tablet by mouth daily, Disp-90 tablet, R-3Normal  -     blood glucose monitor strips; Test two times a day & as needed for symptoms of irregular blood glucose. Dispense sufficient amount for indicated testing frequency plus additional to accommodate PRN testing needs. , Disp-300 strip, R-3, Normal    -advised home blood glucose testing twice daily  -daily feet exam, Foot care: advised to wash feet daily, pat dry and apply lotion at night, avoiding between toes. Need to look at feet daily and report to a physician any signs of inflammation or skin damage  -annual dilated eye exam  -Low carb, low fat diet, increase fruits and vegetables, and exercise 4-5 times a day 30-40 minutes a day discussed  -continue current treatment Metformin 500 mg BID, Farxiga 10 mg, glimepiride  2 mg BID  -continue ARB losartan     2. LVH (left ventricular hypertrophy)  - start    losartan (COZAAR) 25 MG tablet;  Take screen, PHQ9. .    Data Unavailable    No future appointments. SUBJECTIVE/OBJECTIVE:  Jesus Huang (:  1970) has requested an audio/video evaluation for the following concern(s):Diabetes and Hypertension      Has known diabetes mellitus type 2  Home Blood Glucose   Highest 179 from Gatorade   Lowest 70  Checking 1-2 times a day  Taking aspirin, taking lisinopril     Lab Results   Component Value Date    LABA1C 10.3 (H) 2022       Lisinopril is given coughing   LVH on EKG on 2022  Echo2d 2022 LVH, EF >55%      Fredy Mcpherson has Vitamin D deficiency. Fredy Mcpherson  is not taking Vitamin D supplementation   he feels tired. Erectile Dysfunction  Wants something for it    Patient is due for hepatitis B antibodies check. his indication is diabetes mellitus. Fredy Mcpherson does not remember if he was ever vaccinated against hepatitis B. Patient is due for hepatitis C screening. Fredy Mcpherson 's indication is CDC recommendation. Fredy Mcpherson is due for HIV screening due to CDC recommendation to be screened. Fredy Mcpherson denies high risk behavior. Review of Systems   Constitutional:  Positive for fatigue. Negative for activity change, appetite change, chills, diaphoresis, fever and unexpected weight change. Eyes:  Negative for visual disturbance. Respiratory:  Positive for cough (dry). Negative for chest tightness, shortness of breath and wheezing. Cardiovascular:  Negative for chest pain, palpitations and leg swelling. Gastrointestinal:  Negative for abdominal distention, abdominal pain, constipation, diarrhea, nausea and vomiting. Endocrine: Negative for cold intolerance, heat intolerance, polydipsia, polyphagia and polyuria. Genitourinary:  Negative for difficulty urinating and frequency. Erectile dysfunction   Allergic/Immunologic: Positive for immunocompromised state (due to uncontrolled diabetes). Neurological:  Negative for numbness.    Hematological:  Does not bruise/bleed easily. Psychiatric/Behavioral:  Positive for sleep disturbance (works nights). Negative for dysphoric mood. Prior to Visit Medications    Medication Sig Taking? Authorizing Provider   metFORMIN (GLUCOPHAGE-XR) 500 MG extended release tablet Take 1 tablet by mouth in the morning and at bedtime With food Yes Arden Fall MD   dapagliflozin (FARXIGA) 10 MG tablet Take 1 tablet by mouth every morning Yes Arden Fall MD   glimepiride (AMARYL) 2 MG tablet Take 1 tablet by mouth 2 times daily (before meals) Yes Arden Fall MD   aspirin EC 81 MG EC tablet Take 1 tablet by mouth daily Yes Arden Fall MD   glucose monitoring (FREESTYLE FREEDOM) kit 1 kit by Does not apply route daily Yes Myrna Rodriguez MD   blood glucose monitor strips Test two times a day & as needed for symptoms of irregular blood glucose. Dispense sufficient amount for indicated testing frequency plus additional to accommodate PRN testing needs.  Yes Myrna Rodriguez MD   Insulin Pen Needle (MEIJER PEN NEEDLES) 31G X 6 MM MISC 1 each by Does not apply route daily Yes Myrna Rodriguez MD       Social History     Tobacco Use    Smoking status: Never    Smokeless tobacco: Never   Substance Use Topics    Alcohol use: Yes     Comment: during Holidays ONLY    Drug use: Never          PHYSICAL EXAMINATION:    Vital Signs: (As obtained by patient/caregiver or practitioner observation)  -vital signs stable and within normal limits except Obesity per BMI. 34.15 kg/m2    Patient-Reported Vitals 8/25/2022   Patient-Reported Weight 224 lbs   Patient-Reported Height 5 ft 8 in           Intensity of pain is 0/10       Constitutional: [x] Appears well-developed and well-nourished [x] No apparent distress      [x] Abnormal- obesity       Mental status  [x] Alert and awake  [x] Oriented to person/place/time [x]Able to follow commands      Eyes:  EOM    [x]  Normal  [] Abnormal-  Sclera  [x]  Normal  [] Abnormal -         Discharge [x]  None visible [] Abnormal -    HENT:   [x] Normocephalic, atraumatic. [] Abnormal   [x] Mouth/Throat: Mucous membranes are moist.     External Ears [x] Normal  [] Abnormal-     Neck: [x] No visualized mass     Pulmonary/Chest: [x] Respiratory effort normal.  [x] No visualized signs of difficulty breathing or respiratory distress        [] Abnormal        Musculoskeletal:   [x] Normal gait with no signs of ataxia         [x] Normal range of motion of neck        [] Abnormal-       Neurological:        [x] No Facial Asymmetry (Cranial nerve 7 motor function) (limited exam to video visit)          [x] No gaze palsy        [] Abnormal-            Skin:        [x] No significant exanthematous lesions or discoloration noted on facial skin         [] Abnormal-            Psychiatric:      [x] No Hallucinations       [x]Mood is normal      [x]Behavior is normal      [x]Judgment is normal      [x]Thought content is normal       [] Abnormal-     Other pertinent observable physical exam findings- none     Due to this being a TeleHealth encounter, evaluation of the following organ systems is limited: Vitals/Constitutional/EENT/Resp/CV/GI//MS/Neuro/Skin/Heme-Lymph-Imm. I personally reviewed most recent labs reviewed with the patient and all questions fully answered.    Hyperglycemia  Hypokalemia    Otherwise labs within normal limits  Did not do his blood work      Lab Results   Component Value Date    WBC 4.0 04/08/2022    HGB 16.3 04/08/2022    HCT 48.9 04/08/2022    MCV 94.0 04/08/2022     04/08/2022       Lab Results   Component Value Date/Time     04/09/2022 05:32 AM    K 3.6 04/09/2022 05:32 AM     04/09/2022 05:32 AM    CO2 23 04/09/2022 05:32 AM    BUN 11 04/09/2022 05:32 AM    CREATININE 0.76 04/09/2022 05:32 AM    GLUCOSE 176 04/09/2022 05:32 AM    CALCIUM 8.5 04/09/2022 05:32 AM        Lab Results   Component Value Date    ALT 23 04/08/2022    AST 20 04/08/2022    ALKPHOS 95 04/08/2022    BILITOT 0.36 04/08/2022       No results found for: TSHFT4, TSH    No results found for: CHOL  No results found for: TRIG  No results found for: HDL  No results found for: LDLCALC, LDLCHOLESTEROL    No results found for: CHOLHDLRATIO    Lab Results   Component Value Date    LABA1C 10.3 (H) 04/08/2022         No results found for: ZGANELED67    No results found for: VITD25    Orders Placed This Encounter   Medications    losartan (COZAAR) 25 MG tablet     Sig: Take 1 tablet by mouth daily ** stop Lisinopril due to cough**     Dispense:  90 tablet     Refill:  3    aspirin EC 81 MG EC tablet     Sig: Take 1 tablet by mouth daily     Dispense:  90 tablet     Refill:  3    sildenafil (VIAGRA) 50 MG tablet     Sig: Take 1 tablet by mouth as needed for Erectile Dysfunction     Dispense:  30 tablet     Refill:  3    pneumococcal 20-valent conjugat (PREVNAR) 0.5 ML [place] inj     Sig: Inject 0.5 mLs into the muscle once for 1 dose     Dispense:  0.5 mL     Refill:  0    blood glucose monitor strips     Sig: Test two times a day & as needed for symptoms of irregular blood glucose. Dispense sufficient amount for indicated testing frequency plus additional to accommodate PRN testing needs. Dispense:  300 strip     Refill:  3     Brand per patient preference. May round up to next available package size.        Orders Placed This Encounter   Procedures    CBC     Standing Status:   Future     Standing Expiration Date:   8/25/2023    Comprehensive Metabolic Panel     Standing Status:   Future     Standing Expiration Date:   10/22/2022    Hemoglobin A1C     Standing Status:   Future     Standing Expiration Date:   8/25/2023    Hepatitis C Antibody     Standing Status:   Future     Standing Expiration Date:   8/25/2023    HIV Screen     Standing Status:   Future     Standing Expiration Date:   8/25/2023    Vitamin B12 & Folate     Standing Status:   Future     Standing Expiration Date:   10/22/2022    Uric Acid     Standing Status:   Future Standing Expiration Date:   8/25/2023    TSH     Standing Status:   Future     Standing Expiration Date:   8/25/2023    PSA Screening     Standing Status:   Future     Standing Expiration Date:   8/26/2023    Microalbumin / Creatinine Urine Ratio     Standing Status:   Future     Standing Expiration Date:   8/25/2023    Magnesium     Standing Status:   Future     Standing Expiration Date:   8/25/2023    Hepatitis B Surface Antibody     Standing Status:   Future     Standing Expiration Date:   8/26/2023    Vitamin D 25 Hydroxy     Standing Status:   Future     Standing Expiration Date:   8/25/2023       Medications Discontinued During This Encounter   Medication Reason    lisinopril (PRINIVIL;ZESTRIL) 2.5 MG tablet Alternate therapy    blood glucose monitor strips REORDER    aspirin EC 81 MG EC tablet REORDER              Gary Popper, was evaluated through a synchronous (real-time) audio-video encounter. The patient (or guardian if applicable) is aware that this is a billable service, which includes applicable co-pays. The virtual visit was conducted with patient's (and/or legal guardian consent). Verbal consent to proceed has been obtained within the past 12 months. The visit was conducted pursuant to the emergency declaration under the [address] [place] [address], [address] [place] [address] authority and the [employer] and needmade General Act. Patient identification was verified    Patient was alone   Provider was located at primary practice location. The patient was located at home, in a state where the provider was licensed to provide care. On this date 8/25/2022 I have spent 35 minutes reviewing previous notes, test results and face to face with the patient discussing the diagnosis and importance of compliance with the treatment plan as well as documenting on the day of the visit.     --Stella Serrato MD on 8/25/2022 at 12:12 PM    An electronic signature was used to authenticate this note.

## 2022-08-25 NOTE — PATIENT INSTRUCTIONS
Patient Education        Learning About Carbohydrate (Carb) Counting and Eating Out When You Have Diabetes  Why plan your meals? Meal planning can be a key part of managing diabetes. Planning meals and snacks with the right balance of carbohydrate, protein, and fat can help you keep yourblood sugar at the target level you set with your doctor. You don't have to eat special foods. You can eat what your family eats, including sweets once in a while. But you do have to pay attention to how oftenyou eat and how much you eat of certain foods. You may want to work with a dietitian or a diabetes educator. They can give you tips and meal ideas and can answer your questions about meal planning. This health professional can also help you reach a healthy weight if that is one ofyour goals. What should you know about eating carbs? Managing the amount of carbohydrate (carbs) you eat is an important part ofhealthy meals when you have diabetes. Carbohydrate is found in many foods. Learn which foods have carbs. And learn the amounts of carbs in different foods. Bread, cereal, pasta, and rice have about 15 grams of carbs in a serving. A serving is 1 slice of bread (1 ounce), ½ cup of cooked cereal, or 1/3 cup of cooked pasta or rice. Fruits have 15 grams of carbs in a serving. A serving is 1 small fresh fruit, such as an apple or orange; ½ of a banana; ½ cup of cooked or canned fruit; ½ cup of fruit juice; 1 cup of melon or raspberries; or 2 tablespoons of dried fruit. Milk and no-sugar-added yogurt have 15 grams of carbs in a serving. A serving is 1 cup of milk or 3/4 cup (6 oz) of no-sugar-added yogurt. Starchy vegetables have 15 grams of carbs in a serving. A serving is ½ cup of mashed potatoes or sweet potato; 1 cup winter squash; ½ of a small baked potato; ½ cup of cooked beans; or ½ cup cooked corn or green peas.   Learn how much carbs to eat each day and at each meal. A dietitian or certified diabetes educator can teach you how to keep track of the amount of carbs you eat. This is called carbohydrate counting. If you are not sure how to count carbohydrate grams, use the plate method to plan meals. It is a quick way to make sure that you have a balanced meal. It also can help you manage the amount of carbohydrate you eat at meals. Divide your plate by types of foods. Put non-starchy vegetables on half the plate, meat or other protein food on one-quarter of the plate, and a grain or starchy vegetable in the final quarter of the plate. To this you can add a small piece of fruit and 1 cup of milk or yogurt, depending on how many carbs you are supposed to eat at a meal.  Try to eat about the same amount of carbs at each meal. Do not \"save up\" your daily allowance of carbs to eat at one meal.  Proteins have very little or no carbs. Examples of proteins are beef, chicken, turkey, fish, eggs, tofu, cheese, cottage cheese, and peanut butter. How can you eat out and still eat healthy? Learn to estimate the serving sizes of foods that have carbohydrate. If you measure food at home, it will be easier to estimate the amount in a serving of restaurant food. If the meal you order has too much carbohydrate (such as potatoes, corn, or baked beans), ask to have a low-carbohydrate food instead. Ask for a salad or non-starchy vegetables like broccoli, cauliflower, green beans, or peppers. If you eat more carbohydrate at a meal than you had planned, take a walk or do other exercise. This will help lower your blood sugar. What are some tips for eating healthy? Limit saturated fat, such as the fat from meat and dairy products. This is a healthy choice because people who have diabetes are at higher risk of heart disease. So choose lean cuts of meat and nonfat or low-fat dairy products. Use olive or canola oil instead of butter or shortening when cooking. Don't skip meals.  Your blood sugar may drop too low if you skip meals and take insulin or certain medicines for diabetes. Check with your doctor before you drink alcohol. Alcohol can cause your blood sugar to drop too low. Alcohol can also cause a bad reaction if you take certain diabetes medicines. Follow-up care is a key part of your treatment and safety. Be sure to make and go to all appointments, and call your doctor if you are having problems. It's also a good idea to know your test results and keep alist of the medicines you take. Where can you learn more? Go to https://dooyoo.[employer]. org and sign in to your [employer] account. Enter F002 in the [employer] box to learn more about \"Learning About Carbohydrate (Carb) Counting and Eating Out When You Have Diabetes. \"     If you do not have an account, please click on the \"Sign Up Now\" link. Current as of: September 8, 2021               Content Version: 13.3  © 2006-2022 Healthwise, Incorporated. Care instructions adapted under license by [facility] ([facility]). If you have questions about a medical condition or this instruction, always ask your healthcare professional. [address] any warranty or liability for your use of this information.

## 2022-08-25 NOTE — TELEPHONE ENCOUNTER
Called patient to schedule routine follow up appointment with Gio Navarro for 4 months (around 12/25/2022) for Visit type PHYSICAL, VISION screen, PHQ9. ALSO TO COLLECT 20$ CO-PAY  . Patient did not answer so left a detail message. For patient to call office back to schedule follow up appointment as requested.

## 2022-08-27 ENCOUNTER — HOSPITAL ENCOUNTER (OUTPATIENT)
Age: 52
Discharge: HOME | End: 2022-08-27
Payer: COMMERCIAL

## 2022-08-27 DIAGNOSIS — Z12.5 PROSTATE CANCER SCREENING: ICD-10-CM

## 2022-08-27 DIAGNOSIS — E11.65 TYPE 2 DIABETES MELLITUS WITH HYPERGLYCEMIA, WITHOUT LONG-TERM CURRENT USE OF INSULIN (HCC): ICD-10-CM

## 2022-08-27 DIAGNOSIS — Z13.6 SCREENING FOR CARDIOVASCULAR CONDITION: ICD-10-CM

## 2022-08-27 DIAGNOSIS — E55.9 VITAMIN D DEFICIENCY: ICD-10-CM

## 2022-08-27 LAB
ALBUMIN SERPL-MCNC: 4.4 G/DL (ref 3.5–5.2)
ALP BLD-CCNC: 102 U/L (ref 40–129)
ALT SERPL-CCNC: 21 U/L (ref 5–41)
ANION GAP SERPL CALCULATED.3IONS-SCNC: 10 MMOL/L (ref 9–17)
AST SERPL-CCNC: 19 U/L
BILIRUB SERPL-MCNC: 0.64 MG/DL (ref 0.3–1.2)
BUN BLDV-MCNC: 12 MG/DL (ref 6–20)
CALCIUM SERPL-MCNC: 9.5 MG/DL (ref 8.6–10.4)
CHLORIDE BLD-SCNC: 104 MMOL/L (ref 98–107)
CO2: 24 MMOL/L (ref 20–31)
CREAT SERPL-MCNC: 0.76 MG/DL (ref 0.7–1.2)
CREATININE URINE: 394.6 MG/DL (ref 39–259)
FOLATE: 19.1 NG/ML
GFR AFRICAN AMERICAN: >60 ML/MIN
GFR NON-AFRICAN AMERICAN: >60 ML/MIN
GFR SERPL CREATININE-BSD FRML MDRD: ABNORMAL ML/MIN/{1.73_M2}
GLUCOSE BLD-MCNC: 143 MG/DL (ref 70–99)
HBV SURFACE AB TITR SER: <3.5 MIU/ML
HCT VFR BLD CALC: 46.9 % (ref 41–53)
HEMOGLOBIN: 16 G/DL (ref 13.5–17.5)
HEPATITIS C ANTIBODY: NONREACTIVE
HIV AG/AB: NONREACTIVE
MAGNESIUM: 1.9 MG/DL (ref 1.6–2.6)
MCH RBC QN AUTO: 31.9 PG (ref 26–34)
MCHC RBC AUTO-ENTMCNC: 34.2 G/DL (ref 31–37)
MCV RBC AUTO: 93.4 FL (ref 80–100)
MICROALBUMIN/CREAT 24H UR: <12 MG/L
MICROALBUMIN/CREAT UR-RTO: ABNORMAL MCG/MG CREAT
PDW BLD-RTO: 14.5 % (ref 11.5–14.9)
PLATELET # BLD: 198 K/UL (ref 150–450)
PMV BLD AUTO: 8.4 FL (ref 6–12)
POTASSIUM SERPL-SCNC: 4 MMOL/L (ref 3.7–5.3)
PROSTATE SPECIFIC ANTIGEN: 0.6 NG/ML
RBC # BLD: 5.02 M/UL (ref 4.5–5.9)
SODIUM BLD-SCNC: 138 MMOL/L (ref 135–144)
TOTAL PROTEIN: 7.4 G/DL (ref 6.4–8.3)
TSH SERPL DL<=0.05 MIU/L-ACNC: 1.71 UIU/ML (ref 0.3–5)
URIC ACID: 3.7 MG/DL (ref 3.4–7)
VITAMIN B-12: 433 PG/ML (ref 232–1245)
VITAMIN D 25-HYDROXY: 27.1 NG/ML
WBC # BLD: 4.7 K/UL (ref 3.5–11)

## 2022-08-27 PROCEDURE — 86803 HEPATITIS C AB TEST: CPT

## 2022-08-27 PROCEDURE — 80053 COMPREHEN METABOLIC PANEL: CPT

## 2022-08-27 PROCEDURE — 84550 ASSAY OF BLOOD/URIC ACID: CPT

## 2022-08-27 PROCEDURE — 82306 VITAMIN D 25 HYDROXY: CPT

## 2022-08-27 PROCEDURE — 87389 HIV-1 AG W/HIV-1&-2 AB AG IA: CPT

## 2022-08-27 PROCEDURE — 83735 ASSAY OF MAGNESIUM: CPT

## 2022-08-27 PROCEDURE — 83036 HEMOGLOBIN GLYCOSYLATED A1C: CPT

## 2022-08-27 PROCEDURE — 82746 ASSAY OF FOLIC ACID SERUM: CPT

## 2022-08-27 PROCEDURE — 85027 COMPLETE CBC AUTOMATED: CPT

## 2022-08-27 PROCEDURE — 82570 ASSAY OF URINE CREATININE: CPT

## 2022-08-27 PROCEDURE — 82043 UR ALBUMIN QUANTITATIVE: CPT

## 2022-08-27 PROCEDURE — 86317 IMMUNOASSAY INFECTIOUS AGENT: CPT

## 2022-08-27 PROCEDURE — 36415 COLL VENOUS BLD VENIPUNCTURE: CPT

## 2022-08-27 PROCEDURE — 84443 ASSAY THYROID STIM HORMONE: CPT

## 2022-08-27 PROCEDURE — G0103 PSA SCREENING: HCPCS

## 2022-08-27 PROCEDURE — 82607 VITAMIN B-12: CPT

## 2022-08-28 LAB
ESTIMATED AVERAGE GLUCOSE: 123 MG/DL
HBA1C MFR BLD: 5.9 % (ref 4–6)

## 2022-08-29 DIAGNOSIS — E55.9 VITAMIN D DEFICIENCY: Primary | ICD-10-CM

## 2022-08-29 PROBLEM — N52.9 ERECTILE DYSFUNCTION: Status: ACTIVE | Noted: 2022-08-29

## 2022-08-29 RX ORDER — ERGOCALCIFEROL 1.25 MG/1
50000 CAPSULE ORAL WEEKLY
Qty: 12 CAPSULE | Refills: 0 | Status: SHIPPED | OUTPATIENT
Start: 2022-08-29

## 2022-08-29 NOTE — RESULT ENCOUNTER NOTE
Please notify patient: Diabetes greatly improved, hemoglobin A1c 5.9, previously was 10.3. Great job! Blood glucose 143. Vitamin D low, I sent to the pharmacy vitamin D high dosage weekly for 3 months, to take it with food  Urine was concentrated, he needs to drink 8 x 8 ounce glasses of water every day    Otherwise labs within normal limits  continue current treatment    To schedule his next appointment please see checkout note  No future appointments.

## 2022-09-02 DIAGNOSIS — N52.9 ERECTILE DYSFUNCTION, UNSPECIFIED ERECTILE DYSFUNCTION TYPE: ICD-10-CM

## 2022-09-02 DIAGNOSIS — E11.65 TYPE 2 DIABETES MELLITUS WITH HYPERGLYCEMIA, WITHOUT LONG-TERM CURRENT USE OF INSULIN (HCC): ICD-10-CM

## 2022-09-02 DIAGNOSIS — I51.7 LVH (LEFT VENTRICULAR HYPERTROPHY): ICD-10-CM

## 2022-09-02 RX ORDER — METFORMIN HYDROCHLORIDE 500 MG/1
500 TABLET, EXTENDED RELEASE ORAL 2 TIMES DAILY
Qty: 60 TABLET | Refills: 1 | Status: SHIPPED | OUTPATIENT
Start: 2022-09-02

## 2022-09-02 RX ORDER — GLIMEPIRIDE 2 MG/1
2 TABLET ORAL
Qty: 60 TABLET | Refills: 1 | Status: SHIPPED | OUTPATIENT
Start: 2022-09-02

## 2022-09-02 RX ORDER — SILDENAFIL 50 MG/1
50 TABLET, FILM COATED ORAL PRN
Qty: 30 TABLET | Refills: 1 | Status: SHIPPED | OUTPATIENT
Start: 2022-09-02

## 2022-09-02 NOTE — TELEPHONE ENCOUNTER
----- Message from Darby Lind sent at 9/2/2022 12:01 PM EDT -----  Subject: Refill Request    QUESTIONS  Name of Medication? metFORMIN (GLUCOPHAGE-XR) 500 MG extended release   tablet  Patient-reported dosage and instructions? 500 MG 2 x a day  How many days do you have left? 2  [id number]  Pharmacy phone number (if available)? [phone]  ---------------------------------------------------------------------------  --------------,  Name of Medication? glimepiride (AMARYL) 2 MG tablet  Patient-reported dosage and instructions? 2 MG 2 x a day  How many days do you have left? 2  [facility] [id number]  Pharmacy phone number (if available)? [phone]  ---------------------------------------------------------------------------  --------------,  Name of Medication? sildenafil (VIAGRA) 50 MG tablet  Patient-reported dosage and instructions? 50 MG as needed  How many days do you have left? 0  [id number]  Pharmacy phone number (if available)? [phone]  ---------------------------------------------------------------------------  --------------  CALL BACK INFO  What is the best way for the office to contact you? OK to leave message on   voicemail  Preferred Call Back Phone Number? [phone]  ---------------------------------------------------------------------------  --------------  SCRIPT ANSWERS  Relationship to Patient?  Self

## 2022-09-12 ENCOUNTER — TELEPHONE (OUTPATIENT)
Dept: FAMILY MEDICINE CLINIC | Age: 52
End: 2022-09-12

## 2022-09-12 NOTE — TELEPHONE ENCOUNTER
Due to him doing so well with diabetes, with diet changes. He can completely stop [place], when he finishes the bottle.     To continue with glimepiride 2 mg twice a day and metformin 500 mg twice a day  If home blood glucose below 80, to let us know, will cut down glimepiride , in the next phase  Lab Results   Component Value Date    LABA1C 5.9 08/27/2022    LABA1C 10.3 (H) 04/08/2022

## 2022-09-12 NOTE — TELEPHONE ENCOUNTER
Patient called and would like to know if another alternative medication can be called in since his current medication dapagliflozin (FARXIGA) 10 MG tablet [[id number]]     Order Details  Dose: 10 mg Route: Oral Frequency: EVERY MORNING   Dispense Quantity: 90 tablet Refills: 3          Sig: Take 1 tablet by mouth every morning         Start Date: 04/15/22 End Date: --   Written Date: 04/15/22       Is way to expensive for his $45 a month. Please call into local pharmacy.

## 2022-09-22 NOTE — TELEPHONE ENCOUNTER
Spoke with patient and informed him of medication change/stop Pink Taylor when he is done with bottle and to continue metformin and glimepride.

## 2022-12-05 DIAGNOSIS — E55.9 VITAMIN D DEFICIENCY: ICD-10-CM

## 2022-12-05 RX ORDER — ERGOCALCIFEROL 1.25 MG/1
CAPSULE ORAL
Qty: 12 CAPSULE | Refills: 0 | Status: SHIPPED | OUTPATIENT
Start: 2022-12-05

## 2023-01-26 DIAGNOSIS — E11.65 TYPE 2 DIABETES MELLITUS WITH HYPERGLYCEMIA, WITHOUT LONG-TERM CURRENT USE OF INSULIN (HCC): ICD-10-CM

## 2023-01-26 DIAGNOSIS — I51.7 LVH (LEFT VENTRICULAR HYPERTROPHY): ICD-10-CM

## 2023-01-26 RX ORDER — METFORMIN HYDROCHLORIDE 500 MG/1
500 TABLET, EXTENDED RELEASE ORAL 2 TIMES DAILY
Qty: 180 TABLET | Refills: 3 | Status: SHIPPED | OUTPATIENT
Start: 2023-01-26

## 2023-01-26 RX ORDER — GLIMEPIRIDE 2 MG/1
2 TABLET ORAL
Qty: 180 TABLET | Refills: 3 | Status: SHIPPED | OUTPATIENT
Start: 2023-01-26

## 2023-01-26 RX ORDER — LOSARTAN POTASSIUM 25 MG/1
25 TABLET ORAL DAILY
Qty: 90 TABLET | Refills: 3 | Status: SHIPPED | OUTPATIENT
Start: 2023-01-26

## 2023-01-26 NOTE — TELEPHONE ENCOUNTER
Please Approve or Refuse.   Send to Pharmacy per Pt's Request:      Next Visit Date:  3/9/2023   Last Visit Date: 8/25/2022    Hemoglobin A1C (%)   Date Value   08/27/2022 5.9   04/08/2022 10.3 (H)             ( goal A1C is < 7)   BP Readings from Last 3 Encounters:   04/15/22 118/84   04/09/22 (!) 142/87          (goal 120/80)  BUN   Date Value Ref Range Status   08/27/2022 12 6 - 20 mg/dL Final     Creatinine   Date Value Ref Range Status   08/27/2022 0.76 0.70 - 1.20 mg/dL Final     Potassium   Date Value Ref Range Status   08/27/2022 4.0 3.7 - 5.3 mmol/L Final

## 2023-03-09 ENCOUNTER — TELEPHONE (OUTPATIENT)
Dept: FAMILY MEDICINE CLINIC | Age: 53
End: 2023-03-09

## 2023-03-10 NOTE — TELEPHONE ENCOUNTER
[address]  Send Link Via Text  No text has been sent  Last text sent03/09/2023 8:56 AM  To:[phone]  Email  Send Link Via Email  No email has been sent  Last email sent03/09/2023 8:56 AM  To:[email or web address]  This patient cannot be sent a video visit link for this encounter. [address]  Send Link Via Text  No text has been sent  Last text sent03/09/2023 9:06 AM  To:[phone]  Email  Send Link Via Email  No email has been sent  Last email sent03/09/2023 9:06 AM  To:[email or web address]      [address] Phone  Send Link Via Text  No text has been sent  Last text sent03/09/2023 9:07 AM  To:[phone]  Email  Send Link Via Email  No email has been sent  Last email sent03/09/2023 9:07 AM  To:[email or web address]  I left him a voice message at 7:14 AM to click on the link  Left voice message on the second phone number, at 9:21 AM and told him to click on the link

## 2023-03-26 DIAGNOSIS — E55.9 VITAMIN D DEFICIENCY: ICD-10-CM

## 2023-03-27 RX ORDER — ERGOCALCIFEROL 1.25 MG/1
CAPSULE ORAL
Qty: 12 CAPSULE | Refills: 0 | Status: SHIPPED | OUTPATIENT
Start: 2023-03-27

## 2023-03-27 NOTE — TELEPHONE ENCOUNTER
Please Approve or Refuse.   Send to Pharmacy per Pt's Request:      Next Visit Date:  8/15/2023   Last Visit Date: 8/25/2022    Hemoglobin A1C (%)   Date Value   08/27/2022 5.9   04/08/2022 10.3 (H)             ( goal A1C is < 7)   BP Readings from Last 3 Encounters:   04/15/22 118/84   04/09/22 (!) 142/87          (goal 120/80)  BUN   Date Value Ref Range Status   08/27/2022 12 6 - 20 mg/dL Final     Creatinine   Date Value Ref Range Status   08/27/2022 0.76 0.70 - 1.20 mg/dL Final     Potassium   Date Value Ref Range Status   08/27/2022 4.0 3.7 - 5.3 mmol/L Final

## 2023-03-31 DIAGNOSIS — N52.9 ERECTILE DYSFUNCTION, UNSPECIFIED ERECTILE DYSFUNCTION TYPE: ICD-10-CM

## 2023-03-31 RX ORDER — SILDENAFIL 50 MG/1
TABLET, FILM COATED ORAL
Qty: 30 TABLET | Refills: 1 | Status: SHIPPED | OUTPATIENT
Start: 2023-03-31

## 2023-03-31 NOTE — TELEPHONE ENCOUNTER
Please Approve or Refuse.   Send to Pharmacy per Pt's Request:      Next Visit Date:  5/19/2023   Last Visit Date: 8/25/2022    Hemoglobin A1C (%)   Date Value   08/27/2022 5.9   04/08/2022 10.3 (H)             ( goal A1C is < 7)   BP Readings from Last 3 Encounters:   04/15/22 118/84   04/09/22 (!) 142/87          (goal 120/80)  BUN   Date Value Ref Range Status   08/27/2022 12 6 - 20 mg/dL Final     Creatinine   Date Value Ref Range Status   08/27/2022 0.76 0.70 - 1.20 mg/dL Final     Potassium   Date Value Ref Range Status   08/27/2022 4.0 3.7 - 5.3 mmol/L Final

## 2023-05-19 ENCOUNTER — TELEPHONE (OUTPATIENT)
Dept: FAMILY MEDICINE CLINIC | Age: 53
End: 2023-05-19

## 2023-06-07 DIAGNOSIS — N52.9 ERECTILE DYSFUNCTION, UNSPECIFIED ERECTILE DYSFUNCTION TYPE: ICD-10-CM

## 2023-06-07 RX ORDER — SILDENAFIL 50 MG/1
TABLET, FILM COATED ORAL
Qty: 30 TABLET | Refills: 1 | OUTPATIENT
Start: 2023-06-07

## 2023-07-08 DIAGNOSIS — E55.9 VITAMIN D DEFICIENCY: ICD-10-CM

## 2023-07-08 RX ORDER — ERGOCALCIFEROL 1.25 MG/1
CAPSULE ORAL
Qty: 12 CAPSULE | Refills: 0 | OUTPATIENT
Start: 2023-07-08

## 2023-07-14 DIAGNOSIS — E55.9 VITAMIN D DEFICIENCY: ICD-10-CM

## 2023-07-14 DIAGNOSIS — N52.9 ERECTILE DYSFUNCTION, UNSPECIFIED ERECTILE DYSFUNCTION TYPE: ICD-10-CM

## 2023-07-14 RX ORDER — SILDENAFIL 50 MG/1
TABLET, FILM COATED ORAL
Qty: 30 TABLET | Refills: 1 | OUTPATIENT
Start: 2023-07-14

## 2023-07-14 RX ORDER — ERGOCALCIFEROL 1.25 MG/1
CAPSULE ORAL
Qty: 12 CAPSULE | Refills: 0 | OUTPATIENT
Start: 2023-07-14

## 2024-02-15 DIAGNOSIS — E11.65 TYPE 2 DIABETES MELLITUS WITH HYPERGLYCEMIA, WITHOUT LONG-TERM CURRENT USE OF INSULIN (HCC): ICD-10-CM

## 2024-02-15 DIAGNOSIS — I51.7 LVH (LEFT VENTRICULAR HYPERTROPHY): ICD-10-CM

## 2024-02-15 RX ORDER — METFORMIN HYDROCHLORIDE 500 MG/1
TABLET, EXTENDED RELEASE ORAL
Qty: 180 TABLET | Refills: 3 | OUTPATIENT
Start: 2024-02-15

## 2024-02-15 RX ORDER — LOSARTAN POTASSIUM 25 MG/1
25 TABLET ORAL DAILY
Qty: 90 TABLET | Refills: 3 | OUTPATIENT
Start: 2024-02-15

## 2024-02-15 RX ORDER — GLIMEPIRIDE 2 MG/1
4 TABLET ORAL
Qty: 180 TABLET | Refills: 3 | OUTPATIENT
Start: 2024-02-15

## 2024-02-26 DIAGNOSIS — I51.7 LVH (LEFT VENTRICULAR HYPERTROPHY): ICD-10-CM

## 2024-02-26 DIAGNOSIS — E11.65 TYPE 2 DIABETES MELLITUS WITH HYPERGLYCEMIA, WITHOUT LONG-TERM CURRENT USE OF INSULIN (HCC): ICD-10-CM

## 2024-02-26 RX ORDER — LOSARTAN POTASSIUM 25 MG/1
25 TABLET ORAL DAILY
Qty: 90 TABLET | Refills: 3 | OUTPATIENT
Start: 2024-02-26

## 2024-02-26 RX ORDER — GLIMEPIRIDE 2 MG/1
4 TABLET ORAL
Qty: 180 TABLET | Refills: 3 | OUTPATIENT
Start: 2024-02-26

## 2024-02-26 RX ORDER — METFORMIN HYDROCHLORIDE 500 MG/1
TABLET, EXTENDED RELEASE ORAL
Qty: 180 TABLET | Refills: 3 | OUTPATIENT
Start: 2024-02-26

## 2024-03-07 ENCOUNTER — OFFICE VISIT (OUTPATIENT)
Dept: INTERNAL MEDICINE CLINIC | Age: 54
End: 2024-03-07
Payer: COMMERCIAL

## 2024-03-07 VITALS
WEIGHT: 214.4 LBS | SYSTOLIC BLOOD PRESSURE: 118 MMHG | BODY MASS INDEX: 32.49 KG/M2 | HEIGHT: 68 IN | HEART RATE: 78 BPM | DIASTOLIC BLOOD PRESSURE: 76 MMHG | OXYGEN SATURATION: 97 %

## 2024-03-07 DIAGNOSIS — N52.9 ERECTILE DYSFUNCTION, UNSPECIFIED ERECTILE DYSFUNCTION TYPE: ICD-10-CM

## 2024-03-07 DIAGNOSIS — E55.9 VITAMIN D DEFICIENCY: ICD-10-CM

## 2024-03-07 DIAGNOSIS — E66.9 OBESITY (BMI 30-39.9): ICD-10-CM

## 2024-03-07 DIAGNOSIS — Z12.5 PROSTATE CANCER SCREENING: ICD-10-CM

## 2024-03-07 DIAGNOSIS — E11.65 TYPE 2 DIABETES MELLITUS WITH HYPERGLYCEMIA, WITHOUT LONG-TERM CURRENT USE OF INSULIN (HCC): Primary | ICD-10-CM

## 2024-03-07 DIAGNOSIS — I51.7 LVH (LEFT VENTRICULAR HYPERTROPHY): ICD-10-CM

## 2024-03-07 LAB — HBA1C MFR BLD: 6.1 %

## 2024-03-07 PROCEDURE — 83036 HEMOGLOBIN GLYCOSYLATED A1C: CPT | Performed by: INTERNAL MEDICINE

## 2024-03-07 PROCEDURE — 99214 OFFICE O/P EST MOD 30 MIN: CPT | Performed by: INTERNAL MEDICINE

## 2024-03-07 PROCEDURE — 3044F HG A1C LEVEL LT 7.0%: CPT | Performed by: INTERNAL MEDICINE

## 2024-03-07 RX ORDER — SILDENAFIL 50 MG/1
50 TABLET, FILM COATED ORAL PRN
Qty: 30 TABLET | Refills: 0 | Status: SHIPPED | OUTPATIENT
Start: 2024-03-07

## 2024-03-07 SDOH — ECONOMIC STABILITY: FOOD INSECURITY: WITHIN THE PAST 12 MONTHS, THE FOOD YOU BOUGHT JUST DIDN'T LAST AND YOU DIDN'T HAVE MONEY TO GET MORE.: NEVER TRUE

## 2024-03-07 SDOH — ECONOMIC STABILITY: HOUSING INSECURITY
IN THE LAST 12 MONTHS, WAS THERE A TIME WHEN YOU DID NOT HAVE A STEADY PLACE TO SLEEP OR SLEPT IN A SHELTER (INCLUDING NOW)?: NO

## 2024-03-07 SDOH — ECONOMIC STABILITY: INCOME INSECURITY: HOW HARD IS IT FOR YOU TO PAY FOR THE VERY BASICS LIKE FOOD, HOUSING, MEDICAL CARE, AND HEATING?: NOT HARD AT ALL

## 2024-03-07 SDOH — ECONOMIC STABILITY: FOOD INSECURITY: WITHIN THE PAST 12 MONTHS, YOU WORRIED THAT YOUR FOOD WOULD RUN OUT BEFORE YOU GOT MONEY TO BUY MORE.: NEVER TRUE

## 2024-03-07 ASSESSMENT — PATIENT HEALTH QUESTIONNAIRE - PHQ9
SUM OF ALL RESPONSES TO PHQ QUESTIONS 1-9: 0
SUM OF ALL RESPONSES TO PHQ QUESTIONS 1-9: 0
SUM OF ALL RESPONSES TO PHQ9 QUESTIONS 1 & 2: 0
SUM OF ALL RESPONSES TO PHQ QUESTIONS 1-9: 0
SUM OF ALL RESPONSES TO PHQ QUESTIONS 1-9: 0
1. LITTLE INTEREST OR PLEASURE IN DOING THINGS: 0
2. FEELING DOWN, DEPRESSED OR HOPELESS: 0

## 2024-03-07 NOTE — PROGRESS NOTES
Visit Information    Have you changed or started any medications since your last visit including any over-the-counter medicines, vitamins, or herbal medicines? no   Are you having any side effects from any of your medications? -  no  Have you stopped taking any of your medications? Is so, why? -  no    Have you seen any other physician or provider since your last visit? Yes - Records Obtained  Have you had any other diagnostic tests since your last visit? Yes - Records Obtained  Have you been seen in the emergency room and/or had an admission to a hospital since we last saw you? Yes - Records Obtained  Have you had your routine dental cleaning in the past 6 months? no    Have you activated your [employer] account? If not, what are your barriers? Yes     Patient Care Team:  Mary Chandler MD as PCP - General (Family Medicine)  Mary Chandler MD as PCP - Empaneled Provider    Medical History Review  Past Medical, Family, and Social History reviewed and does contribute to the patient presenting condition    Health Maintenance   Topic Date Due    Hepatitis B vaccine (1 of 3 - 3-dose series) Never done    COVID-19 Vaccine (1) Never done    Pneumococcal 0-64 years Vaccine (1 - PCV) Never done    Lipids  Never done    Diabetic retinal exam  Never done    DTaP/Tdap/Td vaccine (1 - Tdap) Never done    Colorectal Cancer Screen  Never done    Shingles vaccine (1 of 2) Never done    A1C test (Diabetic or Prediabetic)  11/27/2022    Diabetic foot exam  04/15/2023    Depression Screen  04/15/2023    Flu vaccine (1) Never done    Diabetic Alb to Cr ratio (uACR) test  08/27/2023    GFR test (Diabetes, CKD 3-4, OR last GFR 15-59)  08/27/2023    Hepatitis C screen  Completed    HIV screen  Completed    Hepatitis A vaccine  Aged Out    Hib vaccine  Aged Out    Polio vaccine  Aged Out    Meningococcal (ACWY) vaccine  Aged Out    Diabetes screen  Discontinued

## 2024-03-07 NOTE — PROGRESS NOTES
[facility]  [address] [address]  Dept: [phone]  Dept Fax: [phone]     Name: Tyler Maza  : 1970           Chief Complaint   Patient presents with    Diabetes     Type 2 Diabetes Mellitus       History of Present Illness:    HPI  Here to est care   DM, sugars running okay   HT , BP running well     Past Medical History:    Past Medical History:   Diagnosis Date    Syncope and collapse 2022    Type 2 diabetes mellitus with hyperglycemia, without long-term current use of insulin (HCC) 2022      Reviewed all health maintenance requirements and ordered appropriate tests  Health Maintenance Due   Topic Date Due    Hepatitis B vaccine (1 of 3 - 3-dose series) Never done    COVID-19 Vaccine (1) Never done    Pneumococcal 0-64 years Vaccine (1 - PCV) Never done    Lipids  Never done    Diabetic retinal exam  Never done    DTaP/Tdap/Td vaccine (1 - Tdap) Never done    Colorectal Cancer Screen  Never done    Shingles vaccine (1 of 2) Never done    Diabetic foot exam  04/15/2023    Depression Screen  04/15/2023    Flu vaccine (1) Never done    Diabetic Alb to Cr ratio (uACR) test  2023    GFR test (Diabetes, CKD 3-4, OR last GFR 15-59)  2023       Past Surgical History:    Past Surgical History:   Procedure Laterality Date    KNEE SURGERY      ACL, MCL        Medications:      Current Outpatient Medications:     sildenafil (VIAGRA) 50 MG tablet, take 1 tablet by mouth if needed for ERECTILE DYSFUNCTION., Disp: 30 tablet, Rfl: 1    vitamin D (ERGOCALCIFEROL) 1.25 MG (00611 UT) CAPS capsule, take 1 capsule by mouth every week, Disp: 12 capsule, Rfl: 0    metFORMIN (GLUCOPHAGE-XR) 500 MG extended release tablet, Take 1 tablet by mouth in the morning and at bedtime With food, Disp: 180 tablet, Rfl: 3    glimepiride (AMARYL) 2 MG tablet, Take 1 tablet by mouth 2 times daily (before meals), Disp: 180 tablet, Rfl: 3    losartan  ----- Message -----  From: Valentine Chaudhari MD  Sent: 5/20/2022  12:18 PM CDT  To: [facility] Msg Pool    Please let him know X ray showed calcific tendonitis: Hard calcium deposit can form on soft tissue, in this case tendons of the rotator cuff in the shoulder. We can try Ice/heat  Referral to PT ??   To continue with prednisone prescription

## 2024-03-17 DIAGNOSIS — E11.65 TYPE 2 DIABETES MELLITUS WITH HYPERGLYCEMIA, WITHOUT LONG-TERM CURRENT USE OF INSULIN (HCC): ICD-10-CM

## 2024-03-17 DIAGNOSIS — I51.7 LVH (LEFT VENTRICULAR HYPERTROPHY): ICD-10-CM

## 2024-03-17 RX ORDER — GLIMEPIRIDE 2 MG/1
4 TABLET ORAL
Qty: 180 TABLET | Refills: 3 | OUTPATIENT
Start: 2024-03-17

## 2024-03-17 RX ORDER — LOSARTAN POTASSIUM 25 MG/1
25 TABLET ORAL DAILY
Qty: 90 TABLET | Refills: 3 | OUTPATIENT
Start: 2024-03-17

## 2024-03-17 RX ORDER — METFORMIN HYDROCHLORIDE 500 MG/1
TABLET, EXTENDED RELEASE ORAL
Qty: 180 TABLET | Refills: 3 | OUTPATIENT
Start: 2024-03-17

## 2024-03-18 DIAGNOSIS — I51.7 LVH (LEFT VENTRICULAR HYPERTROPHY): ICD-10-CM

## 2024-03-18 DIAGNOSIS — E11.65 TYPE 2 DIABETES MELLITUS WITH HYPERGLYCEMIA, WITHOUT LONG-TERM CURRENT USE OF INSULIN (HCC): ICD-10-CM

## 2024-03-18 RX ORDER — LOSARTAN POTASSIUM 25 MG/1
25 TABLET ORAL DAILY
Qty: 90 TABLET | Refills: 3 | Status: SHIPPED | OUTPATIENT
Start: 2024-03-18

## 2024-03-18 RX ORDER — METFORMIN HYDROCHLORIDE 500 MG/1
500 TABLET, EXTENDED RELEASE ORAL 2 TIMES DAILY
Qty: 180 TABLET | Refills: 3 | Status: SHIPPED | OUTPATIENT
Start: 2024-03-18

## 2024-03-21 DIAGNOSIS — E11.65 TYPE 2 DIABETES MELLITUS WITH HYPERGLYCEMIA, WITHOUT LONG-TERM CURRENT USE OF INSULIN (HCC): ICD-10-CM

## 2024-03-21 RX ORDER — GLIMEPIRIDE 2 MG/1
2 TABLET ORAL
Qty: 180 TABLET | Refills: 3 | Status: SHIPPED | OUTPATIENT
Start: 2024-03-21

## 2024-04-01 RX ORDER — SILDENAFIL 50 MG/1
TABLET, FILM COATED ORAL
Qty: 30 TABLET | Refills: 0 | Status: SHIPPED | OUTPATIENT
Start: 2024-04-01

## 2024-04-17 ENCOUNTER — HOSPITAL ENCOUNTER (OUTPATIENT)
Age: 54
Discharge: HOME | End: 2024-04-17
Payer: COMMERCIAL

## 2024-04-17 DIAGNOSIS — Z12.5 PROSTATE CANCER SCREENING: ICD-10-CM

## 2024-04-17 DIAGNOSIS — E66.9 OBESITY (BMI 30-39.9): ICD-10-CM

## 2024-04-17 DIAGNOSIS — N52.9 ERECTILE DYSFUNCTION, UNSPECIFIED ERECTILE DYSFUNCTION TYPE: ICD-10-CM

## 2024-04-17 DIAGNOSIS — E11.65 TYPE 2 DIABETES MELLITUS WITH HYPERGLYCEMIA, WITHOUT LONG-TERM CURRENT USE OF INSULIN (HCC): ICD-10-CM

## 2024-04-17 DIAGNOSIS — E55.9 VITAMIN D DEFICIENCY: ICD-10-CM

## 2024-04-17 LAB
25(OH)D3 SERPL-MCNC: 19.2 NG/ML (ref 30–100)
ALBUMIN SERPL-MCNC: 4.1 G/DL (ref 3.5–5.2)
ALP SERPL-CCNC: 103 U/L (ref 40–129)
ALT SERPL-CCNC: 12 U/L (ref 5–41)
ANION GAP SERPL CALCULATED.3IONS-SCNC: 10 MMOL/L (ref 9–17)
AST SERPL-CCNC: 13 U/L
BASOPHILS # BLD: 0 K/UL (ref 0–0.2)
BASOPHILS NFR BLD: 1 % (ref 0–2)
BILIRUB SERPL-MCNC: 0.3 MG/DL (ref 0.3–1.2)
BILIRUB UR QL STRIP: NEGATIVE
BUN SERPL-MCNC: 9 MG/DL (ref 6–20)
CALCIUM SERPL-MCNC: 9 MG/DL (ref 8.6–10.4)
CHLORIDE SERPL-SCNC: 105 MMOL/L (ref 98–107)
CHOLEST SERPL-MCNC: 181 MG/DL
CHOLESTEROL/HDL RATIO: 4.1
CLARITY UR: CLEAR
CO2 SERPL-SCNC: 24 MMOL/L (ref 20–31)
COLOR UR: YELLOW
COMMENT: NORMAL
CREAT SERPL-MCNC: 1 MG/DL (ref 0.7–1.2)
CREAT UR-MCNC: 197 MG/DL (ref 39–259)
EOSINOPHIL # BLD: 0.2 K/UL (ref 0–0.4)
EOSINOPHILS RELATIVE PERCENT: 5 % (ref 0–4)
ERYTHROCYTE [DISTWIDTH] IN BLOOD BY AUTOMATED COUNT: 14.1 % (ref 11.5–14.9)
EST. AVERAGE GLUCOSE BLD GHB EST-MCNC: 108 MG/DL
GFR SERPL CREATININE-BSD FRML MDRD: 89 ML/MIN/1.73M2
GLUCOSE SERPL-MCNC: 101 MG/DL (ref 70–99)
GLUCOSE UR STRIP-MCNC: NEGATIVE MG/DL
HBA1C MFR BLD: 5.4 % (ref 4–6)
HCT VFR BLD AUTO: 47.6 % (ref 41–53)
HDLC SERPL-MCNC: 44 MG/DL
HGB BLD-MCNC: 15.5 G/DL (ref 13.5–17.5)
HGB UR QL STRIP.AUTO: NEGATIVE
KETONES UR STRIP-MCNC: NEGATIVE MG/DL
LDLC SERPL CALC-MCNC: 122 MG/DL (ref 0–130)
LEUKOCYTE ESTERASE UR QL STRIP: NEGATIVE
LYMPHOCYTES NFR BLD: 1 K/UL (ref 1–4.8)
LYMPHOCYTES RELATIVE PERCENT: 25 % (ref 24–44)
MCH RBC QN AUTO: 32.6 PG (ref 26–34)
MCHC RBC AUTO-ENTMCNC: 32.5 G/DL (ref 31–37)
MCV RBC AUTO: 100.1 FL (ref 80–100)
MICROALBUMIN UR-MCNC: <12 MG/L (ref 0–20)
MICROALBUMIN/CREAT UR-RTO: NORMAL MCG/MG CREAT (ref 0–17)
MONOCYTES NFR BLD: 0.4 K/UL (ref 0.1–1.3)
MONOCYTES NFR BLD: 10 % (ref 1–7)
NEUTROPHILS NFR BLD: 59 % (ref 36–66)
NEUTS SEG NFR BLD: 2.4 K/UL (ref 1.3–9.1)
NITRITE UR QL STRIP: NEGATIVE
PH UR STRIP: 6.5 [PH] (ref 5–8)
PLATELET # BLD AUTO: 213 K/UL (ref 150–450)
PMV BLD AUTO: 8.8 FL (ref 6–12)
POTASSIUM SERPL-SCNC: 4 MMOL/L (ref 3.7–5.3)
PROT SERPL-MCNC: 7.2 G/DL (ref 6.4–8.3)
PROT UR STRIP-MCNC: NEGATIVE MG/DL
PSA SERPL-MCNC: 0.6 NG/ML (ref 0–4)
RBC # BLD AUTO: 4.75 M/UL (ref 4.5–5.9)
SODIUM SERPL-SCNC: 139 MMOL/L (ref 135–144)
SP GR UR STRIP: 1.02 (ref 1–1.03)
TRIGL SERPL-MCNC: 75 MG/DL
TSH SERPL DL<=0.05 MIU/L-ACNC: 1.2 UIU/ML (ref 0.3–5)
UROBILINOGEN UR STRIP-ACNC: NORMAL EU/DL (ref 0–1)
WBC OTHER # BLD: 4 K/UL (ref 3.5–11)

## 2024-04-17 PROCEDURE — 82043 UR ALBUMIN QUANTITATIVE: CPT

## 2024-04-17 PROCEDURE — 85025 COMPLETE CBC W/AUTO DIFF WBC: CPT

## 2024-04-17 PROCEDURE — 36415 COLL VENOUS BLD VENIPUNCTURE: CPT

## 2024-04-17 PROCEDURE — 83036 HEMOGLOBIN GLYCOSYLATED A1C: CPT

## 2024-04-17 PROCEDURE — 80053 COMPREHEN METABOLIC PANEL: CPT

## 2024-04-17 PROCEDURE — 84443 ASSAY THYROID STIM HORMONE: CPT

## 2024-04-17 PROCEDURE — 82570 ASSAY OF URINE CREATININE: CPT

## 2024-04-17 PROCEDURE — 80061 LIPID PANEL: CPT

## 2024-04-17 PROCEDURE — 81003 URINALYSIS AUTO W/O SCOPE: CPT

## 2024-04-17 PROCEDURE — G0103 PSA SCREENING: HCPCS

## 2024-04-17 PROCEDURE — 82306 VITAMIN D 25 HYDROXY: CPT

## 2024-04-18 ENCOUNTER — OFFICE VISIT (OUTPATIENT)
Dept: INTERNAL MEDICINE CLINIC | Age: 54
End: 2024-04-18
Payer: COMMERCIAL

## 2024-04-18 VITALS
DIASTOLIC BLOOD PRESSURE: 74 MMHG | HEART RATE: 77 BPM | WEIGHT: 215 LBS | SYSTOLIC BLOOD PRESSURE: 132 MMHG | OXYGEN SATURATION: 96 % | BODY MASS INDEX: 32.69 KG/M2

## 2024-04-18 DIAGNOSIS — N52.9 ERECTILE DYSFUNCTION, UNSPECIFIED ERECTILE DYSFUNCTION TYPE: ICD-10-CM

## 2024-04-18 DIAGNOSIS — I51.7 LVH (LEFT VENTRICULAR HYPERTROPHY): ICD-10-CM

## 2024-04-18 DIAGNOSIS — E55.9 VITAMIN D DEFICIENCY: ICD-10-CM

## 2024-04-18 DIAGNOSIS — E11.65 TYPE 2 DIABETES MELLITUS WITH HYPERGLYCEMIA, WITHOUT LONG-TERM CURRENT USE OF INSULIN (HCC): Primary | ICD-10-CM

## 2024-04-18 PROCEDURE — 99214 OFFICE O/P EST MOD 30 MIN: CPT | Performed by: INTERNAL MEDICINE

## 2024-04-18 PROCEDURE — 3044F HG A1C LEVEL LT 7.0%: CPT | Performed by: INTERNAL MEDICINE

## 2024-04-18 RX ORDER — ERGOCALCIFEROL 1.25 MG/1
50000 CAPSULE ORAL WEEKLY
Qty: 12 CAPSULE | Refills: 2 | Status: SHIPPED | OUTPATIENT
Start: 2024-04-18

## 2024-04-18 NOTE — PROGRESS NOTES
TRIG 75 04/17/2024     Lab Results   Component Value Date    HDL 44 04/17/2024     Lab Results   Component Value Date    LDLCHOLESTEROL 122 04/17/2024     No results found for: \"VLDL\"  Lab Results   Component Value Date    CHOLHDLRATIO 4.1 04/17/2024        Lab Results   Component Value Date    ALT 12 04/17/2024    AST 13 04/17/2024    ALKPHOS 103 04/17/2024    BILITOT 0.3 04/17/2024         ED ,   Taking meds, side effects discussed     HM discussed   PSA nml   Cologard pending             Completed Refills   Requested Prescriptions      No prescriptions requested or ordered in this encounter     No follow-ups on file.  No orders of the defined types were placed in this encounter.    No orders of the defined types were placed in this encounter.      Electronically signed by Galen Heard MD on 4/18/2024 at 4:25 PM

## 2024-04-23 ENCOUNTER — TELEPHONE (OUTPATIENT)
Dept: INTERNAL MEDICINE CLINIC | Age: 54
End: 2024-04-23

## 2024-05-13 RX ORDER — SILDENAFIL 50 MG/1
TABLET, FILM COATED ORAL
Qty: 30 TABLET | Refills: 0 | Status: SHIPPED | OUTPATIENT
Start: 2024-05-13

## 2024-06-17 RX ORDER — SILDENAFIL 50 MG/1
TABLET, FILM COATED ORAL
Qty: 30 TABLET | Refills: 0 | Status: SHIPPED | OUTPATIENT
Start: 2024-06-17

## 2024-08-02 RX ORDER — SILDENAFIL 50 MG/1
TABLET, FILM COATED ORAL
Qty: 30 TABLET | Refills: 0 | Status: SHIPPED | OUTPATIENT
Start: 2024-08-02

## 2025-01-08 DIAGNOSIS — E55.9 VITAMIN D DEFICIENCY: ICD-10-CM

## 2025-01-08 RX ORDER — ERGOCALCIFEROL 1.25 MG/1
50000 CAPSULE, LIQUID FILLED ORAL WEEKLY
Qty: 12 CAPSULE | Refills: 0 | Status: SHIPPED | OUTPATIENT
Start: 2025-01-08

## 2025-04-09 DIAGNOSIS — E55.9 VITAMIN D DEFICIENCY: ICD-10-CM

## 2025-04-09 RX ORDER — ERGOCALCIFEROL 1.25 MG/1
50000 CAPSULE, LIQUID FILLED ORAL WEEKLY
Qty: 12 CAPSULE | Refills: 0 | OUTPATIENT
Start: 2025-04-09

## 2025-04-11 DIAGNOSIS — E11.65 TYPE 2 DIABETES MELLITUS WITH HYPERGLYCEMIA, WITHOUT LONG-TERM CURRENT USE OF INSULIN (HCC): ICD-10-CM

## 2025-04-11 DIAGNOSIS — I51.7 LVH (LEFT VENTRICULAR HYPERTROPHY): ICD-10-CM

## 2025-04-11 RX ORDER — METFORMIN HYDROCHLORIDE 500 MG/1
500 TABLET, EXTENDED RELEASE ORAL 2 TIMES DAILY
Qty: 180 TABLET | Refills: 3 | Status: SHIPPED | OUTPATIENT
Start: 2025-04-11

## 2025-04-11 RX ORDER — GLIMEPIRIDE 2 MG/1
2 TABLET ORAL
Qty: 180 TABLET | Refills: 3 | Status: SHIPPED | OUTPATIENT
Start: 2025-04-11

## 2025-04-11 RX ORDER — LOSARTAN POTASSIUM 25 MG/1
TABLET ORAL
Qty: 270 TABLET | Refills: 1 | Status: SHIPPED | OUTPATIENT
Start: 2025-04-11

## 2025-04-11 NOTE — TELEPHONE ENCOUNTER
[facility] faxed refill request for Metformin and Glimepiride   Last office visit 4/18/2024

## 2025-05-05 ENCOUNTER — TELEPHONE (OUTPATIENT)
Dept: INTERNAL MEDICINE CLINIC | Age: 55
End: 2025-05-05

## 2025-06-25 ENCOUNTER — OFFICE VISIT (OUTPATIENT)
Dept: INTERNAL MEDICINE CLINIC | Age: 55
End: 2025-06-25
Payer: COMMERCIAL

## 2025-06-25 VITALS
DIASTOLIC BLOOD PRESSURE: 82 MMHG | WEIGHT: 220 LBS | OXYGEN SATURATION: 96 % | BODY MASS INDEX: 33.34 KG/M2 | HEIGHT: 68 IN | SYSTOLIC BLOOD PRESSURE: 122 MMHG | HEART RATE: 93 BPM

## 2025-06-25 DIAGNOSIS — I51.7 LVH (LEFT VENTRICULAR HYPERTROPHY): ICD-10-CM

## 2025-06-25 DIAGNOSIS — E55.9 VITAMIN D DEFICIENCY: ICD-10-CM

## 2025-06-25 DIAGNOSIS — Z12.11 SCREENING FOR MALIGNANT NEOPLASM OF COLON: ICD-10-CM

## 2025-06-25 DIAGNOSIS — E11.65 TYPE 2 DIABETES MELLITUS WITH HYPERGLYCEMIA, WITHOUT LONG-TERM CURRENT USE OF INSULIN (HCC): Primary | ICD-10-CM

## 2025-06-25 DIAGNOSIS — E78.2 MIXED HYPERLIPIDEMIA: ICD-10-CM

## 2025-06-25 DIAGNOSIS — N52.9 ERECTILE DYSFUNCTION, UNSPECIFIED ERECTILE DYSFUNCTION TYPE: ICD-10-CM

## 2025-06-25 DIAGNOSIS — Z12.5 PROSTATE CANCER SCREENING: ICD-10-CM

## 2025-06-25 LAB — HBA1C MFR BLD: 6.3 %

## 2025-06-25 PROCEDURE — 3044F HG A1C LEVEL LT 7.0%: CPT | Performed by: INTERNAL MEDICINE

## 2025-06-25 PROCEDURE — 83036 HEMOGLOBIN GLYCOSYLATED A1C: CPT | Performed by: INTERNAL MEDICINE

## 2025-06-25 PROCEDURE — 99214 OFFICE O/P EST MOD 30 MIN: CPT | Performed by: INTERNAL MEDICINE

## 2025-06-25 RX ORDER — LOSARTAN POTASSIUM 25 MG/1
25 TABLET ORAL DAILY
Qty: 270 TABLET | Refills: 1 | Status: SHIPPED | OUTPATIENT
Start: 2025-06-25

## 2025-06-25 RX ORDER — GLUCOSAMINE HCL/CHONDROITIN SU 500-400 MG
CAPSULE ORAL
Qty: 300 STRIP | Refills: 3 | Status: SHIPPED | OUTPATIENT
Start: 2025-06-25

## 2025-06-25 RX ORDER — GLIMEPIRIDE 2 MG/1
2 TABLET ORAL
Qty: 180 TABLET | Refills: 3 | Status: SHIPPED | OUTPATIENT
Start: 2025-06-25

## 2025-06-25 RX ORDER — SILDENAFIL 50 MG/1
50 TABLET, FILM COATED ORAL PRN
Qty: 30 TABLET | Refills: 0 | Status: SHIPPED | OUTPATIENT
Start: 2025-06-25

## 2025-06-25 RX ORDER — ASPIRIN 81 MG/1
81 TABLET, COATED ORAL DAILY
Qty: 90 TABLET | Refills: 3 | Status: SHIPPED | OUTPATIENT
Start: 2025-06-25

## 2025-06-25 RX ORDER — AVOBENZONE, HOMOSALATE, OCTISALATE, OCTOCRYLENE 30; 40; 45; 26 MG/ML; MG/ML; MG/ML; MG/ML
CREAM TOPICAL
Qty: 200 EACH | Refills: 3 | Status: SHIPPED | OUTPATIENT
Start: 2025-06-25

## 2025-06-25 RX ORDER — AVOBENZONE, HOMOSALATE, OCTISALATE, OCTOCRYLENE 30; 40; 45; 26 MG/ML; MG/ML; MG/ML; MG/ML
1 CREAM TOPICAL 2 TIMES DAILY
COMMUNITY

## 2025-06-25 RX ORDER — ERGOCALCIFEROL 1.25 MG/1
50000 CAPSULE, LIQUID FILLED ORAL WEEKLY
Qty: 12 CAPSULE | Refills: 0 | Status: SHIPPED | OUTPATIENT
Start: 2025-06-25

## 2025-06-25 RX ORDER — METFORMIN HYDROCHLORIDE 500 MG/1
500 TABLET, EXTENDED RELEASE ORAL 2 TIMES DAILY
Qty: 180 TABLET | Refills: 3 | Status: SHIPPED | OUTPATIENT
Start: 2025-06-25

## 2025-06-25 SDOH — ECONOMIC STABILITY: FOOD INSECURITY: WITHIN THE PAST 12 MONTHS, THE FOOD YOU BOUGHT JUST DIDN'T LAST AND YOU DIDN'T HAVE MONEY TO GET MORE.: NEVER TRUE

## 2025-06-25 SDOH — ECONOMIC STABILITY: FOOD INSECURITY: WITHIN THE PAST 12 MONTHS, YOU WORRIED THAT YOUR FOOD WOULD RUN OUT BEFORE YOU GOT MONEY TO BUY MORE.: NEVER TRUE

## 2025-06-25 ASSESSMENT — PATIENT HEALTH QUESTIONNAIRE - PHQ9
SUM OF ALL RESPONSES TO PHQ QUESTIONS 1-9: 0
1. LITTLE INTEREST OR PLEASURE IN DOING THINGS: NOT AT ALL
2. FEELING DOWN, DEPRESSED OR HOPELESS: NOT AT ALL

## 2025-06-25 NOTE — PROGRESS NOTES
Have you been to the ER, urgent care clinic since your last visit?  Hospitalized since your last visit?   NO    Have you seen or consulted any other health care providers outside our system since your last visit?   NO      “Have you had a colorectal cancer screening such as a colonoscopy/FIT/Cologuard?    NO    No colonoscopy on file  No cologuard on file  No FIT/FOBT on file   No flexible sigmoidoscopy on file     “Have you had a diabetic eye exam?”    NO     No diabetic eye exam on file       Tyler Maza provided verbal consent for the provider to use the CHANDA recording tool during their visit.

## 2025-06-25 NOTE — PROGRESS NOTES
[facility]  [address] [address]  Dept: [phone]  Dept Fax: [phone]     Name: Tyler Maza  : 1970           Chief Complaint   Patient presents with    Diabetes       History of Present Illness  The patient presents for a follow-up visit.    He reports no current health complaints. He is on a regimen of losartan 25 mg daily for blood pressure management. He has discontinued the use of baby aspirin. He does not experience any tingling or numbness and reports no swelling in his feet.    He has never undergone a colonoscopy and expresses a preference for the Cologuard test over a colonoscopy.    He is on Viagra for erectile dysfunction.    SOCIAL HISTORY  He does not smoke. He drinks alcohol occasionally.           Past Medical History:    Past Medical History:   Diagnosis Date    Syncope and collapse 2022    Type 2 diabetes mellitus with hyperglycemia, without long-term current use of insulin (HCC) 2022      Reviewed all health maintenance requirements and ordered appropriate tests  Health Maintenance Due   Topic Date Due    Diabetic retinal exam  Never done    Hepatitis B vaccine (1 of 3 - 19+ 3-dose series) Never done    DTaP/Tdap/Td vaccine (1 - Tdap) Never done    Pneumococcal 50+ years Vaccine (1 of 2 - PCV) Never done    Colorectal Cancer Screen  Never done    Shingles vaccine (1 of 2) Never done    Diabetic foot exam  04/15/2023    COVID-19 Vaccine ( - - season) Never done    Diabetic Alb to Cr ratio (uACR) test  2025    Lipids  2025    GFR test (Diabetes, CKD 3-4, OR last GFR 15-59)  2025       Past Surgical History:    Past Surgical History:   Procedure Laterality Date    KNEE SURGERY      ACL, MCL        Medications:      Current Outpatient Medications:     glimepiride (AMARYL) 2 MG tablet, Take 1 tablet by mouth 2 times daily (before meals), Disp: 180 tablet, Rfl: 3    blood glucose monitor strips,

## 2025-07-08 ENCOUNTER — TELEPHONE (OUTPATIENT)
Dept: INTERNAL MEDICINE CLINIC | Age: 55
End: 2025-07-08

## 2025-07-08 NOTE — TELEPHONE ENCOUNTER
LVM - informed patient appointment on 9/25 has to be rescheduled and to either call back or use [employer].

## 2025-07-21 ENCOUNTER — TELEPHONE (OUTPATIENT)
Dept: INTERNAL MEDICINE CLINIC | Age: 55
End: 2025-07-21